# Patient Record
Sex: FEMALE | Race: WHITE | Employment: FULL TIME | URBAN - METROPOLITAN AREA
[De-identification: names, ages, dates, MRNs, and addresses within clinical notes are randomized per-mention and may not be internally consistent; named-entity substitution may affect disease eponyms.]

---

## 2023-10-16 DIAGNOSIS — M06.00 SERONEGATIVE RHEUMATOID ARTHRITIS (HCC): Primary | ICD-10-CM

## 2023-10-16 NOTE — TELEPHONE ENCOUNTER
Called patient. Trying to obtain medical records from last office. [facility]: [facility]: [facility]. Pharmacy: [facility]. - Called to verify No PA on Humira. PA start    Prior authorization for: Humira     Medication form: 40mg    Submission method: ABD    Date submitted: 10/16/23    Tracking #:    [id number] result: pended      Last labs: 3-4-5/ 23, LOV: 3/4/5- 23   Sample appropriate? Patient doesn't feel all better. Feet and hands.   Future Appointments   Date Time Provider [place] Toddi   12/4/2023  1:40 PM Joyce Rosenberg, MD [facility] [address]     Labs: pended labs:  Go to [facility] lab  Belen stiles

## 2023-10-20 RX ORDER — ADALIMUMAB 40MG/0.4ML
40 KIT SUBCUTANEOUS
Qty: 2 EACH | Refills: 3 | Status: SHIPPED | OUTPATIENT
Start: 2023-10-20 | End: 2023-10-24

## 2023-10-23 NOTE — TELEPHONE ENCOUNTER
PA status:  Called ABD:     Fax from [facility]: requesting new rx or verification   [facility]    Humira 40mg/0.4mL subcutaneous pen injector kit  3 refills

## 2023-10-24 RX ORDER — ADALIMUMAB 40MG/0.4ML
40 KIT SUBCUTANEOUS
Qty: 2 EACH | Refills: 3 | Status: SHIPPED | OUTPATIENT
Start: 2023-10-24

## 2023-10-24 NOTE — TELEPHONE ENCOUNTER
Fax from [facility] needing script for Humira. Was [facility] to [facility]. Patient insurance will need to be filled with [facility]. Resent to corrected pharmacy.

## 2023-10-30 ENCOUNTER — TELEPHONE (OUTPATIENT)
Dept: RHEUMATOLOGY | Facility: CLINIC | Age: 48
End: 2023-10-30

## 2023-10-30 NOTE — TELEPHONE ENCOUNTER
LOV: 12/20/2022  Future Appointments   Date Time Provider [place] Toddi   12/4/2023  1:40 PM Alejandro Rosenberg MD [facility] [address]   LABS:  [facility]

## 2023-10-31 RX ORDER — FOLIC ACID 1 MG/1
1 TABLET ORAL DAILY
Qty: 90 TABLET | Refills: 3 | Status: SHIPPED | OUTPATIENT
Start: 2023-10-31

## 2023-12-04 ENCOUNTER — APPOINTMENT (OUTPATIENT)
Dept: GENERAL RADIOLOGY | Age: 48
End: 2023-12-04
Attending: INTERNAL MEDICINE
Payer: COMMERCIAL

## 2023-12-04 ENCOUNTER — LAB ENCOUNTER (OUTPATIENT)
Dept: LAB | Age: 48
End: 2023-12-04
Attending: INTERNAL MEDICINE
Payer: COMMERCIAL

## 2023-12-04 ENCOUNTER — OFFICE VISIT (OUTPATIENT)
Dept: RHEUMATOLOGY | Facility: CLINIC | Age: 48
End: 2023-12-04
Payer: COMMERCIAL

## 2023-12-04 VITALS
TEMPERATURE: 98 F | SYSTOLIC BLOOD PRESSURE: 122 MMHG | OXYGEN SATURATION: 97 % | DIASTOLIC BLOOD PRESSURE: 70 MMHG | RESPIRATION RATE: 16 BRPM | WEIGHT: 196 LBS | HEART RATE: 73 BPM | HEIGHT: 64.34 IN | BODY MASS INDEX: 33.46 KG/M2

## 2023-12-04 DIAGNOSIS — M15.9 PRIMARY OSTEOARTHRITIS INVOLVING MULTIPLE JOINTS: ICD-10-CM

## 2023-12-04 DIAGNOSIS — M06.00 SERONEGATIVE RHEUMATOID ARTHRITIS (HCC): ICD-10-CM

## 2023-12-04 DIAGNOSIS — M79.7 FIBROMYALGIA: ICD-10-CM

## 2023-12-04 DIAGNOSIS — M05.79 RHEUMATOID ARTHRITIS INVOLVING MULTIPLE SITES WITH POSITIVE RHEUMATOID FACTOR (HCC): Primary | ICD-10-CM

## 2023-12-04 DIAGNOSIS — M05.79 RHEUMATOID ARTHRITIS INVOLVING MULTIPLE SITES WITH POSITIVE RHEUMATOID FACTOR (HCC): ICD-10-CM

## 2023-12-04 PROBLEM — M15.0 PRIMARY OSTEOARTHRITIS INVOLVING MULTIPLE JOINTS: Status: ACTIVE | Noted: 2023-12-04

## 2023-12-04 LAB
ALBUMIN SERPL-MCNC: 4.4 G/DL (ref 3.4–5)
ALBUMIN/GLOB SERPL: 1.3 {RATIO} (ref 1–2)
ALP LIVER SERPL-CCNC: 87 U/L
ALT SERPL-CCNC: 55 U/L
ANION GAP SERPL CALC-SCNC: 4 MMOL/L (ref 0–18)
AST SERPL-CCNC: 37 U/L (ref 15–37)
BASOPHILS # BLD AUTO: 0.06 X10(3) UL (ref 0–0.2)
BASOPHILS NFR BLD AUTO: 0.8 %
BILIRUB SERPL-MCNC: 0.5 MG/DL (ref 0.1–2)
BUN BLD-MCNC: 17 MG/DL (ref 9–23)
CALCIUM BLD-MCNC: 9.3 MG/DL (ref 8.5–10.1)
CHLORIDE SERPL-SCNC: 106 MMOL/L (ref 98–112)
CO2 SERPL-SCNC: 26 MMOL/L (ref 21–32)
CREAT BLD-MCNC: 1 MG/DL
EGFRCR SERPLBLD CKD-EPI 2021: 70 ML/MIN/1.73M2 (ref 60–?)
EOSINOPHIL # BLD AUTO: 0.37 X10(3) UL (ref 0–0.7)
EOSINOPHIL NFR BLD AUTO: 4.8 %
ERYTHROCYTE [DISTWIDTH] IN BLOOD BY AUTOMATED COUNT: 13.1 %
ERYTHROCYTE [SEDIMENTATION RATE] IN BLOOD: 7 MM/HR
FASTING STATUS PATIENT QL REPORTED: NO
GLOBULIN PLAS-MCNC: 3.5 G/DL (ref 2.8–4.4)
GLUCOSE BLD-MCNC: 94 MG/DL (ref 70–99)
HBV SURFACE AB SER QL: NONREACTIVE
HBV SURFACE AB SERPL IA-ACNC: <3.1 MIU/ML
HBV SURFACE AG SER-ACNC: <0.1 [IU]/L
HBV SURFACE AG SERPL QL IA: NONREACTIVE
HCT VFR BLD AUTO: 40.6 %
HGB BLD-MCNC: 13.7 G/DL
IMM GRANULOCYTES # BLD AUTO: 0.03 X10(3) UL (ref 0–1)
IMM GRANULOCYTES NFR BLD: 0.4 %
LYMPHOCYTES # BLD AUTO: 3.1 X10(3) UL (ref 1–4)
LYMPHOCYTES NFR BLD AUTO: 40.2 %
MCH RBC QN AUTO: 33.1 PG (ref 26–34)
MCHC RBC AUTO-ENTMCNC: 33.7 G/DL (ref 31–37)
MCV RBC AUTO: 98.1 FL
MONOCYTES # BLD AUTO: 0.43 X10(3) UL (ref 0.1–1)
MONOCYTES NFR BLD AUTO: 5.6 %
NEUTROPHILS # BLD AUTO: 3.72 X10 (3) UL (ref 1.5–7.7)
NEUTROPHILS # BLD AUTO: 3.72 X10(3) UL (ref 1.5–7.7)
NEUTROPHILS NFR BLD AUTO: 48.2 %
OSMOLALITY SERPL CALC.SUM OF ELEC: 283 MOSM/KG (ref 275–295)
PLATELET # BLD AUTO: 327 10(3)UL (ref 150–450)
POTASSIUM SERPL-SCNC: 4.4 MMOL/L (ref 3.5–5.1)
PROT SERPL-MCNC: 7.9 G/DL (ref 6.4–8.2)
RBC # BLD AUTO: 4.14 X10(6)UL
SODIUM SERPL-SCNC: 136 MMOL/L (ref 136–145)
TSI SER-ACNC: 1.65 MIU/ML (ref 0.36–3.74)
URATE SERPL-MCNC: 5.4 MG/DL
VIT D+METAB SERPL-MCNC: 29 NG/ML (ref 30–100)
WBC # BLD AUTO: 7.7 X10(3) UL (ref 4–11)

## 2023-12-04 PROCEDURE — 36415 COLL VENOUS BLD VENIPUNCTURE: CPT

## 2023-12-04 PROCEDURE — 85652 RBC SED RATE AUTOMATED: CPT | Performed by: INTERNAL MEDICINE

## 2023-12-04 PROCEDURE — 86480 TB TEST CELL IMMUN MEASURE: CPT

## 2023-12-04 PROCEDURE — 87522 HEPATITIS C REVRS TRNSCRPJ: CPT

## 2023-12-04 PROCEDURE — 87340 HEPATITIS B SURFACE AG IA: CPT

## 2023-12-04 PROCEDURE — 85025 COMPLETE CBC W/AUTO DIFF WBC: CPT | Performed by: INTERNAL MEDICINE

## 2023-12-04 PROCEDURE — 84443 ASSAY THYROID STIM HORMONE: CPT

## 2023-12-04 PROCEDURE — 80053 COMPREHEN METABOLIC PANEL: CPT

## 2023-12-04 PROCEDURE — 86706 HEP B SURFACE ANTIBODY: CPT

## 2023-12-04 PROCEDURE — 82306 VITAMIN D 25 HYDROXY: CPT

## 2023-12-04 PROCEDURE — 84550 ASSAY OF BLOOD/URIC ACID: CPT

## 2023-12-04 RX ORDER — ADALIMUMAB 40MG/0.4ML
40 KIT SUBCUTANEOUS
Qty: 2 EACH | Refills: 5 | Status: SHIPPED | OUTPATIENT
Start: 2023-12-04

## 2023-12-04 NOTE — PATIENT INSTRUCTIONS
OSTEOARTHRITIS    Fast Facts    Though some of the joint changes are irreversible, most patients will not need joint replacement surgery. OA symptoms (what you feel) can vary greatly among patients. A rheumatologist can detect arthritis and prescribe the proper treatment. The goal of treatment in OA is to reduce pain and improve function. Exercise is an important part of OA treatment, because it can decrease joint pain and improve function. At present, there is no treatment that can reverse the damage of OA in the joints. Researchers are trying to find ways to slow or reverse this joint damage. Osteoarthritis (also known as OA) is a common joint disease that most often affects middle-age to elderly people. It is commonly referred to as \"wear and tear\" of the joints, but we now know that OA is a disease of the entire joint, involving the cartilage, joint lining, ligaments, and bone. Although it is more common in older people, it is not really accurate to say that the joints are just \"wearing out. \" It is characterized by breakdown of the cartilage (the tissue that cushions the ends of the bones between joints), bony changes of the joints, deterioration of tendons and ligaments, and various degrees of inflammation of the joint lining (called the synovium). This arthritis tends to occur in the hand joints, spine, hips, knees, and great toes. The lifetime risk of developing OA of the knee is about 46%, and the lifetime risk of developing OA of the hip is 25%, according to the [facility], a long-term study from the [address] [place] [address] and sponsored by [employer] for [employer] and [employer] (often called the [employer]) and the [employer]. OA is a top cause of disability in older people. The goal of osteoarthritis treatment is to reduce pain and improve function.  There is no cure for the disease, but some treatments attempt to slow disease progression. What is osteoarthritis? OA is a frequently slowly progressive joint disease typically seen in middle-aged to elderly people. In osteoarthritis, the cartilage between the bones in the joint breaks down. This causes the affected bones to slowly get bigger. The joint cartilage often breaks down because of mechanical stress or biochemical changes within the body, causing the bone underneath to fail. OA can occur together with other types of arthritis, such as gout or rheumatoid arthritis. OA tends to affect commonly used joints such as the hands and spine, and the weight-bearing joints such as the hips and knees. Symptoms include:    Joint pain and stiffness    Knobby swelling at the joint    Cracking or grinding noise with joint movement    Decreased function of the joint    How do you treat osteoarthritis? There is no proven treatment yet that can reverse joint damage from OA. The goal of osteoarthritis treatment is to reduce pain and improve function of the affected joints. Most often, this is possible with a mixture of physical measures and drug therapy and, sometimes, surgery. Physical measures: Weight loss and exercise are useful in OA. Excess weight puts stress on your knee joints and hips and low back. For every 10 pounds of weight you lose over 10 years, you can reduce the chance of developing knee OA by up to 50 percent. Exercise can improve your muscle strength, decrease joint pain and stiffness, and lower the chance of disability due to OA. Also helpful are support (\"assistive\") devices, such as orthotics or a walking cane, that help you do daily activities. Heat or cold therapy can help relieve OA symptoms for a short time. Certain alternative treatments such as spa (hot tub), massage, and chiropractic manipulation can help relieve pain for a short time. They can be costly, though, and require repeated treatments.  Also, the long-term benefits of these alternative (sometimes called complementary or integrative) medicine treatments are unproven but are under study. Drug therapy: Forms of drug therapy include topical, oral (by mouth) and injections (shots). You apply topical drugs directly on the skin over the affected joints. These medicines include capsaicin cream, lidocaine and diclofenac gel. Oral pain relievers such as acetaminophen are common first treatments. So are nonsteroidal anti-inflammatory drugs (often called NSAIDs), which decrease swelling and pain. In 2010, the government (FDA) approved the use of duloxetine (Cymbalta) for chronic (long-term) musculoskeletal pain including from OA. This oral drug is not new. It also is in use for other health concerns, such as mood disorders, nerve pain and fibromyalgia. Patients with more serious pain may need stronger medications, such as prescription narcotics. Joint injections with corticosteroids (sometimes called cortisone shots) or with a form of lubricant called hyaluronic acid can give months of pain relief from OA. This lubricant is given in the knee, and these shots may help delay the need for a knee replacement by a few years in some patients. Surgery: Surgical treatment becomes an option for severe cases. This includes when the joint has serious damage, or when medical treatment fails to relieve pain and you have major loss of function. Surgery may involve arthroscopy, repair of the joint done through small incisions (cuts). If the joint damage cannot be repaired, you may need a joint replacement. Supplements: Many over-the-counter nutrition supplements have been used for osteoarthritis treatment. Most lack good research data to support their effectiveness and safety. Among the most widely used are calcium, vitamin D and omega-3 fatty acids. To ensure safety and avoid drug interactions, consult your doctor or pharmacist before using any of these supplements.  This is especially true when you are combining these supplements with prescribed

## 2023-12-05 DIAGNOSIS — E55.9 VITAMIN D DEFICIENCY: Primary | ICD-10-CM

## 2023-12-05 NOTE — TELEPHONE ENCOUNTER
Spoke to patient regarding the below message:    omponent  Ref Range & Units 1 d ago   Vitamin D, 25OH, Total  30.0 - 100.0 ng/mL 29.0 Low    Comment: Literature Recommendations for 25(OH)D levels are:  Range           Vitamin D Status   <20    ng/mL      Deficiency   20-<30 ng/mL      Insufficiency    ng/mL      Sufficiency   >100   ng/mL      Toxicity         Vitamin D is low at 29 would do high-dose vitamin D 50,000 units once a week for 3 months and then OTC 3 to 4000 IU D3  CBC CMP look good. We can refill methotrexate for 3 months. Please send back to me     Let me know if she needs the meloxicam as well and folic acid     Patient verbalized understanding and denied any questions at this time. Patient states the she is not sure if she needs refills on her meloxicam or folic acid but if she does she will call back and let us know.      High dose vitamin D script pended to you for approval.

## 2023-12-05 NOTE — TELEPHONE ENCOUNTER
omponent  Ref Range & Units 1 d ago   Vitamin D, 25OH, Total  30.0 - 100.0 ng/mL 29.0 Low    Comment: Literature Recommendations for 25(OH)D levels are:  Range           Vitamin D Status   <20    ng/mL      Deficiency   20-<30 ng/mL      Insufficiency    ng/mL      Sufficiency   >100   ng/mL      Toxicity       Vitamin D is low at 29 would do high-dose vitamin D 50,000 units once a week for 3 months and then OTC 3 to 4000 IU D3  CBC CMP look good. We can refill methotrexate for 3 months.   Please send back to me    Let me know if she needs the meloxicam as well and folic acid

## 2023-12-06 ENCOUNTER — PATIENT MESSAGE (OUTPATIENT)
Dept: RHEUMATOLOGY | Facility: CLINIC | Age: 48
End: 2023-12-06

## 2023-12-06 DIAGNOSIS — E55.9 VITAMIN D DEFICIENCY: Primary | ICD-10-CM

## 2023-12-06 LAB
M TB IFN-G CD4+ T-CELLS BLD-ACNC: 0.01 IU/ML
M TB TUBERC IFN-G BLD QL: NEGATIVE
M TB TUBERC IGNF/MITOGEN IGNF CONTROL: >10 IU/ML
QFT TB1 AG MINUS NIL: 0.03 IU/ML
QFT TB2 AG MINUS NIL: 0 IU/ML

## 2023-12-06 RX ORDER — ERGOCALCIFEROL 1.25 MG/1
50000 CAPSULE ORAL WEEKLY
Qty: 12 CAPSULE | Refills: 0 | Status: SHIPPED | OUTPATIENT
Start: 2023-12-06 | End: 2024-02-28

## 2023-12-06 RX ORDER — ERGOCALCIFEROL 1.25 MG/1
50000 CAPSULE ORAL WEEKLY
Qty: 12 CAPSULE | Refills: 0 | Status: SHIPPED | OUTPATIENT
Start: 2023-12-06 | End: 2024-01-05

## 2023-12-06 NOTE — TELEPHONE ENCOUNTER
From: Brie Rivas  To: Amando Rosenberg  Sent: 12/6/2023 10:56 AM CST  Subject: Vitamin d prescription     Hello. I know it was mentioned about me taking vitamin d prescription. But I was wondering when that would be sent in to [place] off [facility]. I was there yesterday and it had not been sent yet.      Thanks

## 2023-12-07 ENCOUNTER — HOSPITAL ENCOUNTER (OUTPATIENT)
Dept: GENERAL RADIOLOGY | Age: 48
Discharge: HOME | End: 2023-12-07
Attending: INTERNAL MEDICINE
Payer: COMMERCIAL

## 2023-12-07 DIAGNOSIS — M79.7 FIBROMYALGIA: ICD-10-CM

## 2023-12-07 DIAGNOSIS — M05.79 RHEUMATOID ARTHRITIS INVOLVING MULTIPLE SITES WITH POSITIVE RHEUMATOID FACTOR (HCC): ICD-10-CM

## 2023-12-07 PROCEDURE — 73130 X-RAY EXAM OF HAND: CPT | Performed by: INTERNAL MEDICINE

## 2023-12-07 PROCEDURE — 71046 X-RAY EXAM CHEST 2 VIEWS: CPT | Performed by: INTERNAL MEDICINE

## 2023-12-11 ENCOUNTER — TELEPHONE (OUTPATIENT)
Dept: RHEUMATOLOGY | Facility: CLINIC | Age: 48
End: 2023-12-11

## 2023-12-11 NOTE — TELEPHONE ENCOUNTER
PROCEDURE:  XR CHEST PA + LAT CHEST (CPT=71046)     INDICATIONS:  M79.7 Fibromyalgia M05.79 Rheumatoid arthritis involving multiple sites with positive rheumatoid factor (HCC)     COMPARISON:  None. TECHNIQUE:  PA and lateral chest radiographs were obtained. PATIENT STATED HISTORY: (As transcribed by Technologist)  Patient shares she is having evaluation for her rheumatoid arthritis. FINDINGS:    Cardiac size and pulmonary vasculature are within normal limits. No pleural effusions. No pneumothorax. Left lingular opacity. Impression  CONCLUSION:  Opacity within the left lingula which may represent atelectasis versus infiltrates. Chest x-ray shows a possible infiltrate versus just lung collapse. If she has no fevers chills shortness of breath cough or recent infection likely related to some lung collapse which means she just has to take some deep breath    To open up her lungs. If she develops any worsening shortness of breath fevers chills shortness of breath productive cough etc. she would need to see her PCP and consider infection and antibiotics    Everything else looks good on her labs so far.

## 2023-12-12 NOTE — TELEPHONE ENCOUNTER
Called pt reviewed Dr. Ivone Man note with pt  Chest x-ray shows a possible infiltrate versus just lung collapse. If she has no fevers chills shortness of breath cough or recent infection likely related to some lung collapse which means she just has to take some deep breath     To open up her lungs. If she develops any worsening shortness of breath fevers chills shortness of breath productive cough etc. she would need to see her PCP and consider infection and antibiotics     Everything else looks good on her labs so far.          Pt denies any signs of infection , but will reach out to PCP for occasional wimpy cough

## 2024-02-24 DIAGNOSIS — E55.9 VITAMIN D DEFICIENCY: ICD-10-CM

## 2024-02-26 RX ORDER — ERGOCALCIFEROL 1.25 MG/1
50000 CAPSULE ORAL WEEKLY
Qty: 12 CAPSULE | Refills: 0 | Status: SHIPPED | OUTPATIENT
Start: 2024-02-26

## 2024-02-26 NOTE — TELEPHONE ENCOUNTER
Future Appointments   Date Time Provider Department Center   6/4/2024 10:00 AM Dimple Rosenberg MD [facility] [address]     Last office visit: 12/4/2023    Last fill: 12/6/2023 12 cap, 0 refills

## 2024-04-11 ENCOUNTER — PATIENT MESSAGE (OUTPATIENT)
Dept: RHEUMATOLOGY | Facility: CLINIC | Age: 49
End: 2024-04-11

## 2024-04-11 DIAGNOSIS — M05.79 RHEUMATOID ARTHRITIS INVOLVING MULTIPLE SITES WITH POSITIVE RHEUMATOID FACTOR (HCC): Primary | ICD-10-CM

## 2024-04-11 DIAGNOSIS — M06.00 SERONEGATIVE RHEUMATOID ARTHRITIS (HCC): ICD-10-CM

## 2024-04-11 NOTE — TELEPHONE ENCOUNTER
Phoned pt, explained per Dr. Rosenberg LOV note pt to have blood work completed every 8-12 wks. Pt states she had completed at [facility] on 3/29. Explained that I will submit letter to [facility] to request. Letter faxed to Eric Lipan requesting copy of labs.

## 2024-04-22 ENCOUNTER — PATIENT MESSAGE (OUTPATIENT)
Dept: RHEUMATOLOGY | Facility: CLINIC | Age: 49
End: 2024-04-22

## 2024-04-22 DIAGNOSIS — M05.79 RHEUMATOID ARTHRITIS INVOLVING MULTIPLE SITES WITH POSITIVE RHEUMATOID FACTOR (HCC): ICD-10-CM

## 2024-04-22 DIAGNOSIS — M06.00 SERONEGATIVE RHEUMATOID ARTHRITIS (HCC): ICD-10-CM

## 2024-04-22 RX ORDER — METHOTREXATE 2.5 MG/1
TABLET ORAL
Qty: 60 TABLET | Refills: 0 | Status: SHIPPED | OUTPATIENT
Start: 2024-04-22

## 2024-04-22 NOTE — TELEPHONE ENCOUNTER
[employer] message sent to patient regarding her lab results and methotrexate. Patient was asked to call the office with any questions she may have.

## 2024-05-06 RX ORDER — METHOTREXATE 2.5 MG/1
TABLET ORAL
Qty: 65 TABLET | Refills: 0 | Status: CANCELLED
Start: 2024-05-06

## 2024-05-06 RX ORDER — METHOTREXATE 2.5 MG/1
12.5 TABLET ORAL WEEKLY
Qty: 65 TABLET | Refills: 0 | OUTPATIENT
Start: 2024-05-06

## 2024-05-06 RX ORDER — METHOTREXATE 2.5 MG/1
TABLET ORAL
Qty: 65 TABLET | Refills: 0 | Status: SHIPPED | OUTPATIENT
Start: 2024-05-06 | End: 2024-05-06

## 2024-05-10 ENCOUNTER — APPOINTMENT (OUTPATIENT)
Dept: URGENT CARE | Age: 49
End: 2024-05-10

## 2024-05-10 ENCOUNTER — TELEPHONE (OUTPATIENT)
Dept: OTHER | Age: 49
End: 2024-05-10

## 2024-05-10 ENCOUNTER — WALK IN (OUTPATIENT)
Dept: URGENT CARE | Age: 49
End: 2024-05-10

## 2024-05-10 VITALS
RESPIRATION RATE: 16 BRPM | TEMPERATURE: 97.5 F | HEART RATE: 63 BPM | OXYGEN SATURATION: 98 % | SYSTOLIC BLOOD PRESSURE: 126 MMHG | DIASTOLIC BLOOD PRESSURE: 89 MMHG

## 2024-05-10 DIAGNOSIS — J30.2 SEASONAL ALLERGIES: Primary | ICD-10-CM

## 2024-05-10 DIAGNOSIS — H57.11 EYE DISCOMFORT, RIGHT: ICD-10-CM

## 2024-05-10 RX ORDER — MELOXICAM 15 MG/1
15 TABLET ORAL DAILY
COMMUNITY

## 2024-05-10 RX ORDER — ADALIMUMAB 40MG/0.4ML
KIT SUBCUTANEOUS
COMMUNITY
Start: 2023-12-04

## 2024-05-10 RX ORDER — METHOTREXATE 2.5 MG/1
12.5 TABLET ORAL
COMMUNITY
Start: 2024-05-07

## 2024-05-10 RX ORDER — DULOXETIN HYDROCHLORIDE 60 MG/1
60 CAPSULE, DELAYED RELEASE ORAL DAILY
COMMUNITY
Start: 2024-03-03

## 2024-05-10 RX ORDER — FOLIC ACID 1 MG/1
TABLET ORAL
COMMUNITY
Start: 2024-05-10

## 2024-05-10 ASSESSMENT — ENCOUNTER SYMPTOMS
COUGH: 0
EYE PAIN: 0
CHILLS: 0
FEVER: 0
BLURRED VISION: 0
PHOTOPHOBIA: 0
SHORTNESS OF BREATH: 0
EYE REDNESS: 1
DOUBLE VISION: 0
EYE DISCHARGE: 0
HEADACHES: 0
DIZZINESS: 0

## 2024-05-10 ASSESSMENT — VISUAL ACUITY
OS_CC: 20/25
OD_CC: 20/15

## 2024-05-13 ENCOUNTER — PATIENT MESSAGE (OUTPATIENT)
Dept: RHEUMATOLOGY | Facility: CLINIC | Age: 49
End: 2024-05-13

## 2024-05-13 DIAGNOSIS — M05.79 RHEUMATOID ARTHRITIS INVOLVING MULTIPLE SITES WITH POSITIVE RHEUMATOID FACTOR (HCC): Primary | ICD-10-CM

## 2024-05-13 DIAGNOSIS — M79.7 FIBROMYALGIA: ICD-10-CM

## 2024-05-13 DIAGNOSIS — M15.9 PRIMARY OSTEOARTHRITIS INVOLVING MULTIPLE JOINTS: ICD-10-CM

## 2024-05-14 RX ORDER — MELOXICAM 15 MG/1
15 TABLET ORAL DAILY
Qty: 90 TABLET | Refills: 0 | Status: SHIPPED | OUTPATIENT
Start: 2024-05-14

## 2024-05-14 NOTE — TELEPHONE ENCOUNTER
Future Appointments   Date Time Provider Department Center   6/4/2024 10:00 AM Dimple Rosenberg MD [facility] [address]     Last office visit: 12/4/2023    Last fill: 2/3/2024 90 each, 0 refills    Last lab: 3/30/2024 (Scanned)

## 2024-05-14 NOTE — TELEPHONE ENCOUNTER
From: Melissa Bowman  To: Dimple Rosenberg  Sent: 5/13/2024 7:24 PM CDT  Subject: Refills needed please.     Hello,  I went to refill my meloxicam, which is past due, and Karly attempted to send a request in today.   Just wondering if it was received or not.   If not, could I please get a refill of meloxicam se t to [facility] in [facility].   Also, this is the last week for vitamin d medication and I’m not sure if Dr. Rosenberg wants me to continue that, if so, that is out as well (as of this coming Friday).   Thank you.

## 2024-05-23 DIAGNOSIS — E55.9 VITAMIN D DEFICIENCY: ICD-10-CM

## 2024-05-23 RX ORDER — ERGOCALCIFEROL 1.25 MG/1
50000 CAPSULE ORAL WEEKLY
Qty: 12 CAPSULE | Refills: 0 | OUTPATIENT
Start: 2024-05-23

## 2024-06-04 ENCOUNTER — OFFICE VISIT (OUTPATIENT)
Dept: RHEUMATOLOGY | Facility: CLINIC | Age: 49
End: 2024-06-04
Payer: COMMERCIAL

## 2024-06-04 VITALS
DIASTOLIC BLOOD PRESSURE: 80 MMHG | BODY MASS INDEX: 34.15 KG/M2 | OXYGEN SATURATION: 97 % | HEART RATE: 69 BPM | SYSTOLIC BLOOD PRESSURE: 120 MMHG | RESPIRATION RATE: 16 BRPM | TEMPERATURE: 98 F | HEIGHT: 64 IN | WEIGHT: 200 LBS

## 2024-06-04 DIAGNOSIS — Z79.899 ENCOUNTER FOR DRUG THERAPY: ICD-10-CM

## 2024-06-04 DIAGNOSIS — M06.00 SERONEGATIVE RHEUMATOID ARTHRITIS (HCC): ICD-10-CM

## 2024-06-04 DIAGNOSIS — M05.79 RHEUMATOID ARTHRITIS INVOLVING MULTIPLE SITES WITH POSITIVE RHEUMATOID FACTOR (HCC): Primary | ICD-10-CM

## 2024-06-04 DIAGNOSIS — D84.9 IMMUNOCOMPROMISED (HCC): ICD-10-CM

## 2024-06-04 DIAGNOSIS — M15.9 PRIMARY OSTEOARTHRITIS INVOLVING MULTIPLE JOINTS: ICD-10-CM

## 2024-06-04 DIAGNOSIS — M79.7 FIBROMYALGIA: ICD-10-CM

## 2024-06-04 PROCEDURE — 3074F SYST BP LT 130 MM HG: CPT | Performed by: INTERNAL MEDICINE

## 2024-06-04 PROCEDURE — 3008F BODY MASS INDEX DOCD: CPT | Performed by: INTERNAL MEDICINE

## 2024-06-04 PROCEDURE — 99214 OFFICE O/P EST MOD 30 MIN: CPT | Performed by: INTERNAL MEDICINE

## 2024-06-04 PROCEDURE — 3079F DIAST BP 80-89 MM HG: CPT | Performed by: INTERNAL MEDICINE

## 2024-06-04 PROCEDURE — G2211 COMPLEX E/M VISIT ADD ON: HCPCS | Performed by: INTERNAL MEDICINE

## 2024-06-04 RX ORDER — ADALIMUMAB 40MG/0.4ML
40 KIT SUBCUTANEOUS
Qty: 2 EACH | Refills: 5 | Status: SHIPPED | OUTPATIENT
Start: 2024-06-04

## 2024-06-04 RX ORDER — DULOXETIN HYDROCHLORIDE 60 MG/1
60 CAPSULE, DELAYED RELEASE ORAL DAILY
Qty: 90 CAPSULE | Refills: 1 | Status: SHIPPED | OUTPATIENT
Start: 2024-06-04

## 2024-06-04 RX ORDER — MELOXICAM 15 MG/1
15 TABLET ORAL DAILY
Qty: 90 TABLET | Refills: 1 | Status: SHIPPED | OUTPATIENT
Start: 2024-06-04

## 2024-06-04 RX ORDER — CETIRIZINE HYDROCHLORIDE 10 MG/1
10 TABLET ORAL DAILY
COMMUNITY
Start: 2023-05-28

## 2024-06-04 NOTE — PROGRESS NOTES
[facility], [address] [place] [address]      Consult     Melissa Bowman Patient Status:  No patient class for patient encounter    3/28/1975 MRN [id number]   Location [facility], [address] [place] [address] Attending No att. providers found   Hosp Day # 0 PCP Johnnie Walker DO     Referring Provider: PCP    Reason for Consultation: Seropositive rheumatoid arthritis; osteoarthritis fibromyalgia    Subjective:    Melissa Bowman is a 49 year old female with     HPI:Seropositive +RF, +CCP Rheumatoid Arthritis, Osteoarthritis, Fibromyalgia    49-year-old woman comes in for followup for seropositive RA and osteoarthritis and fibromyalgia.     Patient was last seen in clinic 2023    Continued on Humira 40 mg subcu every other week and methotrexate 15 mg once a week    Labs 2024 normal CBC CMP    She has been doing well overall    Overall no flare ups of her rheumatoid arthritis.     Previously given trazodone but she never tried it with Cymbalta 60 mg daily because of fear of side effects    She never ended up seeing a psychiatrist or therapist.    Continues to have stressors with nonrestorative sleep fatigue and some neuropathies because of ongoing issues with child support and tax issues with her previous ex- from 2018 as she is fighting with the [employer]    She states no flareups of her rheumatoid arthritis itself.     At that time she continued on Humira 40 subcutaneous every other week and methotrexate 15mg once a week.     She states somehow she has been getting her medications despite no labs since last visit    X-rays of the hands and feet showed stable arthritic changes 2023 chest x-ray showed atelectasis patient had no signs or symptoms of infection.  Continues to do well without any recent infections shortness of breath or chest pain    Denies any fevers or chills    Reminded patient to update labs shortly.  New standing labs given to  patient    She was also continued on Cymbalta 60 mg daily.    She takes them her meloxicam periodically     She is not having any major flareups since last visit.    SHe states no infection since last visit.     Previously she was also diagnosed with ADHD depression and anxiety to her PCP.     Denies Any suicidal ideation. Her left shoulder has been doing well. When this flares up she sees her orthopedic surgeon.    She has not had a shoulder injection quite. MRI did reveal previously moderate a.c. joint arthritis.     Denies any swelling or pain of her other joints.     Her rheumatoid arthritis has been stable. Denies any fevers or chills no shortness breath or chest pain. Denies any infection since last visit. Her weight has been stable.    As for her RA, she otherwise feels she is stable. Last Vectra was in low disease activity range at 28 in December 2016. She denies swelling in pain in her other joints.     She historically has had positive RF and CCP antibodies and negative KENYON.     History/Other:        Past Medical History:  Past Medical History:    Fibromyalgia    Rheumatoid arthritis (HCC)        Past Surgical History: History reviewed. No pertinent surgical history.    Social History:  reports that she has quit smoking. She has never used smokeless tobacco. She reports current alcohol use. She reports that she does not use drugs.    Family History: History reviewed. No pertinent family history.    Allergies:   Allergies   Allergen Reactions    Sulfa Antibiotics HIVES, PALPITATIONS, SHORTNESS OF BREATH and UNKNOWN     Other reaction(s): Palpitations       Current Medications:  Current Outpatient Medications   Medication Sig Dispense Refill    cetirizine (ZYRTEC ALLERGY) 10 MG Oral Tab Take 1 tablet (10 mg total) by mouth daily.      Adalimumab (HUMIRA, 2 PEN,) 40 MG/0.4ML Subcutaneous Pen-injector Kit Inject 40 mg into the skin every 14 (fourteen) days. 2 each 5    DULoxetine 60 MG Oral Cap DR Particles  Take 1 capsule (60 mg total) by mouth daily. 90 capsule 1    Meloxicam 15 MG Oral Tab Take 1 tablet (15 mg total) by mouth daily. 90 tablet 1    methotrexate 2.5 MG Oral Tab Take 5 tablets (12.5 mg total) by mouth once a week. 65 tablet 0    folic acid 1 MG Oral Tab Take 1 tablet (1 mg total) by mouth daily. 90 tablet 3      No current facility-administered medications for this visit.       (Not in a hospital admission)      Review of Systems:     Constitutional: Negative for chills, , fatigue, fever and unexpected weight change.    HENT: Negative for congestion, and mouth sores.    Eyes: Negative for photophobia, pain, redness and visual disturbance.    Respiratory: Negative for apnea, cough, chest tightness, shortness of breath, wheezing and stridor.    Cardiovascular: Negative for chest pain, palpitations and leg swelling.    Gastrointestinal: Negative for abdominal distention, abdominal pain, blood in stool, constipation, diarrhea and nausea.    Endocrine: Negative.     Genitourinary: Negative for decreased urine volume, difficulty urinating, dyspareunia, dysuria, flank pain, and frequency.    Musculoskeletal: Occasional for arthralgias, no gait problem and joint swelling.    Skin: Negative for color change, pallor and rash. No raynauds or digital ulcerations no sclerodactly.    Allergic/Immunologic: Negative.    Neurological: Negative for dizziness, tremors, seizures, syncope, speech difficulty, weakness, light-headedness, numbness and headaches.    Hematological: Does not bruise/bleed easily.    Psychiatric/Behavioral: Negative for confusion, decreased concentration, hallucinations, self-injury, sleep disturbance and suicidal ideas or depression.    Objective:   [unfilled]  Vitals:    06/04/24 0954   BP: 120/80   Pulse: 69   Resp: 16   Temp: 97.7 °F (36.5 °C)          Constitutional: is oriented to person, place, and time. Appears well-developed and well-nourished. No distress.    HEENT: Normocephalic;  EOMI; no jvd; no LAD; no oral or nasal ulcers.     Eyes: Conjunctivae and EOM are normal. Pupils are equal, round, and reactive to light.     Neck: Normal range of motion. No thyromegaly present.    Cardiovascular: RRR, no murmurs.    Lungs: Clear, Bilateral air entry, no wheezes.    Abdominal: Soft.    Musculoskeletal:    There [facility]. Go to the Rheumatology activity and [facility].     Joint Exam 06/04/2024     No joint exam has been documented for this visit        Swollen: -- 0    Tender: -- 0        Right shoulder: Exhibits normal range of motion on abduction and internal rotation, no tenderness, no bony tenderness, no deformity, no laceration, no pain and no spasm.        Left shoulder: Exhibits normal range of motion on abduction and internal and external rotation.  no tenderness, no bony tenderness, no swelling, no effusion, no deformity, no pain, no spasm and normal strength.        Right elbow:  Exhibits normal range of motion, no swelling, no effusion and no deformity. No tenderness found. No medial epicondyle, no lateral epicondyle and no olecranon process tenderness noted. There are no contractures or tophi or nodules.        Left elbow:  Normal range of motion, no swelling, no effusion and no deformity. No medial epicondyle, no lateral epicondyle and no olecranon process tenderness noted. There are no contractures or tophi or nodules.        Right wrist:  Exhibits normal range of motion, no tenderness, no bony tenderness, no swelling, no effusion and no crepitus. Flexion and extension intact w/o limitation.        Left wrist: Exhibits normal range of motion, no tenderness, no bony tenderness, no swelling, no effusion, no crepitus and no deformity. Flexion and extension intact without limitation.        Right hip: Exhibits normal range of motion, normal strength, no tenderness, no bony tenderness, no swelling and no crepitus.         Right hand: No synovitis of MCP,PIP or DIP joints; no Bouchards or Heberden nodules noted;  strength: 100%.  Mild squaring first CMC joint        Left hand: No synovitis of MCP,PIP or DIP joints; no Bouchards or Heberden nodules noted;  strength: 100%.  Mild squaring first CMC joint        Left hip: Exhibits normal range of motion, normal strength, no tenderness, no bony tenderness, No swelling and no crepitus.        Right knee: Exhibits normal range of motion, no swelling, no effusion, no ecchymosis, no deformity and no erythema. No tenderness found. No medial joint line, no lateral joint line, no MCL and no LCL tenderness noted. mild crepitation on flexion of knee and extension normal.        Left knee:  Exhibits normal range of motion, no swelling, no effusion, no ecchymosis and no erythema. No tenderness found. No medial joint line, no lateral joint line and no patellar tendon tenderness noted. mild crepitation on flexion of the knee. Extension intact and normal.        Right ankle: No swelling, no deformity. No tenderness. Dorsiflexion and plantar flexion intact without limitation in range of motion.        Left ankle: Exhibits no swelling. No tenderness. No lateral malleolus and no medial malleolus tenderness found. Achilles tendon normal. Achilles tendon exhibits no pain, no defect and normal Echeverria's test results.  Dorsiflexion and plantar flexion intact without limitation in range of motion.        Cervical back: Exhibits normal range of motion, no tenderness, no bony tenderness, no swelling, no pain and no spasm.        Thoracic back: Exhibits normal range of motion, no tenderness, no bony tenderness and no spasm.        Lumbar back:  Exhibits normal range of motion, no tenderness, no bony tenderness, no pain and no spasm.        Right foot: normal. There is normal range of motion, no tenderness, no bony tenderness, no crepitus and no laceration. There is no synovitis or tenderness of the MTP  joints to palpation.  Bony enlargement first MTP joint        Left foot: normal. There is normal range of motion, no tenderness, no bony tenderness and no crepitus. There is no synovitis or tenderness of the MTP joints to palpation.  Bony enlargement first MTP joint    Lymphadenopathy: No submental, no submandibular, and no occipital adenopathy present, has no cervical adenopathy or axillary lympadenopathy.    Neurological: Alert and oriented. No focal motor or sensory abnormalities. Strength is 5/5 Upper Extremities/Lower Extremities proximally and distally.    Skin: Skin is warm, dry and intact.  No rashes no purpuric petechial lesions    Psychiatric: Normal behavior.    Results:    Labs:      Lab Results   Component Value Date    WBC 7.7 12/04/2023    RBC 4.14 12/04/2023    HGB 13.7 12/04/2023    HCT 40.6 12/04/2023    MCV 98.1 12/04/2023    MCH 33.1 12/04/2023    MCHC 33.7 12/04/2023    RDW 13.1 12/04/2023    .0 12/04/2023       No components found for: \"RELY\", \"NMET\", \"MYEL\", \"PROMY\", \"SUNIL\", \"ABSNEUTS\", \"ABSBANDS\", \"ABMM\", \"ABMY\", \"ABPM\", \"ABBL\"      Lab Results   Component Value Date     12/04/2023    K 4.4 12/04/2023    CO2 26.0 12/04/2023    BUN 17 12/04/2023    ALB 4.4 12/04/2023    AST 37 12/04/2023    ALT 55 12/04/2023          No components found for: \"ESRWESTERGRN\"       No results found for: \"CRP\"      No results found for: \"COLOR\", \"CLARITY\", \"UROBILINOGEN\", \"YEAST\"  @LABRCNTIP(RF,B12)@      [unfilled]    Imaging:  No results found.    MRI 6/17: left shoulder:    X rays foot 11/15:    X ray hands 11/15:    Chest x ray 8/15:      Assessment & Plan:      Seropositive rheumatoid arthritis  Osteoarthritis  Fibromyalgia with nonrestorative sleep fatigue depression neuropathies insomnia  Encounter for therapeutic drug monitoring    Patient has no synovitis on exam.  Continue Humira 40 mg subcutaneous every other week  continue methotrexate to 15mg a week   Continue monitoring labs every 8-12  weeks for drug toxicity.  Labs reviewed March 2024 CBC CMP sed rate normal  New standing labs given to patient  Patient states she has been taking methotrexate but unclear when last labs were.  Discussed the importance of monitoring for toxicity  Mainly chronic changes of the right foot and MTP joints. She does have significant calcaneal spurs based on x-rays. She had no erosive changes.   Hand x-rays showing stable arthritic changes.  Chest x-ray showing atelectasis no signs symptomatically of infection.  From December 2023  Discussed the importance of compliance to prevent long term joint damage     She is having a lot of financial issues at this time from previous divorce    Recent death of her dad coping better more recently    Generalized osteoarthritis: Continue Tylenol.    Bone health: She is still having periods. She is not postmenopausal.    Chronic plantar fasciitis improving with intervention stretching and foot inserts. Responded to Medrol Dosepak in the past    Myofascial pain syndrome//fibromyalgia: Continue Cymbalta 60 mg daily.     recommend she see a psychiatrist for management of her depression, anxiety and ADHD.   Appears to be better controlled. No insomnia is the biggest problem       Education and counseling provided to patient.  Instructed patient to call my office or seek medical attention immediately if symptoms worsen. Risks and side effects of medications and diagnosis discussed in detail and patient was given written information on new prescribed medications.    Return to clinic:  Return in about 6 months (around 12/4/2024).    Dimple Rosenberg MD  12/4/2023

## 2024-07-23 DIAGNOSIS — M05.79 RHEUMATOID ARTHRITIS INVOLVING MULTIPLE SITES WITH POSITIVE RHEUMATOID FACTOR (HCC): ICD-10-CM

## 2024-07-23 DIAGNOSIS — M06.00 SERONEGATIVE RHEUMATOID ARTHRITIS (HCC): ICD-10-CM

## 2024-07-23 RX ORDER — METHOTREXATE 2.5 MG/1
TABLET ORAL
Qty: 20 TABLET | Refills: 0 | OUTPATIENT
Start: 2024-07-23

## 2024-07-23 NOTE — TELEPHONE ENCOUNTER
LOV:  06/04/2024    Future Appointments   Date Time Provider Department Center   12/4/2024  9:40 AM Dimple Rosenberg MD [id number] [address]       LF:  05/07/2024    QTY:   65    Refills:   0    LABS: Scanned under the lab tab on 05/01/2024 DRAWN on 03/29/2024

## 2024-07-24 NOTE — TELEPHONE ENCOUNTER
Left detailed message on patient's voicemail regarding the below message:    She is overdue for labs.  I see the last 1 scanned her April or March     Patient was asked to call the office back with any questions she may have.

## 2024-08-06 DIAGNOSIS — M06.00 SERONEGATIVE RHEUMATOID ARTHRITIS (HCC): ICD-10-CM

## 2024-08-06 DIAGNOSIS — M05.79 RHEUMATOID ARTHRITIS INVOLVING MULTIPLE SITES WITH POSITIVE RHEUMATOID FACTOR (HCC): ICD-10-CM

## 2024-08-06 RX ORDER — METHOTREXATE 2.5 MG/1
12.5 TABLET ORAL WEEKLY
Qty: 65 TABLET | Refills: 0 | OUTPATIENT
Start: 2024-08-06

## 2024-08-12 DIAGNOSIS — M79.7 FIBROMYALGIA: ICD-10-CM

## 2024-08-12 DIAGNOSIS — M05.79 RHEUMATOID ARTHRITIS INVOLVING MULTIPLE SITES WITH POSITIVE RHEUMATOID FACTOR (HCC): ICD-10-CM

## 2024-08-12 DIAGNOSIS — M15.0 PRIMARY OSTEOARTHRITIS INVOLVING MULTIPLE JOINTS: ICD-10-CM

## 2024-08-12 RX ORDER — MELOXICAM 15 MG/1
15 TABLET ORAL DAILY
Qty: 90 TABLET | Refills: 1 | Status: SHIPPED | OUTPATIENT
Start: 2024-08-12

## 2024-08-12 NOTE — TELEPHONE ENCOUNTER
LOV:  06/04/2024    Future Appointments   Date Time Provider Department Center   12/4/2024  9:40 AM Dimple Rosenberg MD [facility] [address]       LF:  05/07/2024    QTY:   65    Refills:   0    LABS: Scanned under the lab tab on 08/07 and DRAWN on 06/17/2024

## 2024-10-30 DIAGNOSIS — M79.7 FIBROMYALGIA: Primary | ICD-10-CM

## 2024-10-30 RX ORDER — FOLIC ACID 1 MG/1
1 TABLET ORAL DAILY
Qty: 90 TABLET | Refills: 3 | Status: SHIPPED | OUTPATIENT
Start: 2024-10-30

## 2024-10-30 NOTE — TELEPHONE ENCOUNTER
Last office visit: 6/4/24    Next Rheum Apt:12/4/2024 Dimple Rosenberg MD    Last fill: 10/31/23    Labs:   Lab Results   Component Value Date    CREATSERUM 1.00 12/04/2023    ALKPHO 87 12/04/2023    AST 37 12/04/2023    ALT 55 12/04/2023    BILT 0.5 12/04/2023    TP 7.9 12/04/2023    ALB 4.4 12/04/2023       Lab Results   Component Value Date    WBC 7.7 12/04/2023    HGB 13.7 12/04/2023    .0 12/04/2023    NEPRELIM 3.72 12/04/2023    NEPERCENT 48.2 12/04/2023    LYPERCENT 40.2 12/04/2023    NE 3.72 12/04/2023    LYMABS 3.10 12/04/2023

## 2024-11-07 ENCOUNTER — TELEPHONE (OUTPATIENT)
Dept: RHEUMATOLOGY | Facility: CLINIC | Age: 49
End: 2024-11-07

## 2024-11-07 DIAGNOSIS — M79.7 FIBROMYALGIA: ICD-10-CM

## 2024-11-07 DIAGNOSIS — M05.79 RHEUMATOID ARTHRITIS INVOLVING MULTIPLE SITES WITH POSITIVE RHEUMATOID FACTOR (HCC): Primary | ICD-10-CM

## 2024-11-07 DIAGNOSIS — M06.00 SERONEGATIVE RHEUMATOID ARTHRITIS (HCC): ICD-10-CM

## 2024-11-07 NOTE — TELEPHONE ENCOUNTER
Last office visit: 6/4/24    Next Rheum Apt:12/4/2024 Dimple Rosenberg MD    Last fill:   Methotrexate: 5/7/24  Folic Acid: 10/30/24    Labs:   Lab Results   Component Value Date    CREATSERUM 1.00 12/04/2023    ALKPHO 87 12/04/2023    AST 37 12/04/2023    ALT 55 12/04/2023    BILT 0.5 12/04/2023    TP 7.9 12/04/2023    ALB 4.4 12/04/2023       Lab Results   Component Value Date    WBC 7.7 12/04/2023    HGB 13.7 12/04/2023    .0 12/04/2023    NEPRELIM 3.72 12/04/2023    NEPERCENT 48.2 12/04/2023    LYPERCENT 40.2 12/04/2023    NE 3.72 12/04/2023    LYMABS 3.10 12/04/2023

## 2024-11-11 RX ORDER — FOLIC ACID 1 MG/1
1 TABLET ORAL DAILY
Qty: 90 TABLET | Refills: 3 | Status: SHIPPED | OUTPATIENT
Start: 2024-11-11

## 2024-11-11 RX ORDER — METHOTREXATE 2.5 MG/1
12.5 TABLET ORAL WEEKLY
Qty: 65 TABLET | Refills: 0 | Status: SHIPPED | OUTPATIENT
Start: 2024-11-11

## 2024-12-23 DIAGNOSIS — M06.00 SERONEGATIVE RHEUMATOID ARTHRITIS (HCC): Primary | ICD-10-CM

## 2024-12-23 RX ORDER — ADALIMUMAB 40MG/0.4ML
40 KIT SUBCUTANEOUS
Qty: 2 EACH | Refills: 3 | Status: SHIPPED | OUTPATIENT
Start: 2024-12-23

## 2024-12-23 NOTE — TELEPHONE ENCOUNTER
Last office visit: 6/4/2024      Next Rheum Apt:1/30/2025 Dimple Rosenberg MD    Last fill: Humira 6/4/2024  2 each, 5 refills     Labs:   Lab Results   Component Value Date    CREATSERUM 1.00 12/04/2023    ALKPHO 87 12/04/2023    AST 37 12/04/2023    ALT 55 12/04/2023    BILT 0.5 12/04/2023    TP 7.9 12/04/2023    ALB 4.4 12/04/2023       Lab Results   Component Value Date    WBC 7.7 12/04/2023    HGB 13.7 12/04/2023    .0 12/04/2023    NEPRELIM 3.72 12/04/2023    NEPERCENT 48.2 12/04/2023    LYPERCENT 40.2 12/04/2023    NE 3.72 12/04/2023    LYMABS 3.10 12/04/2023

## 2025-01-08 DIAGNOSIS — M05.79 RHEUMATOID ARTHRITIS INVOLVING MULTIPLE SITES WITH POSITIVE RHEUMATOID FACTOR (HCC): ICD-10-CM

## 2025-01-08 DIAGNOSIS — M06.00 SERONEGATIVE RHEUMATOID ARTHRITIS (HCC): Primary | ICD-10-CM

## 2025-01-08 RX ORDER — ADALIMUMAB 40MG/0.4ML
40 KIT SUBCUTANEOUS
Qty: 2 EACH | Refills: 3 | Status: SHIPPED | OUTPATIENT
Start: 2025-01-08

## 2025-01-08 RX ORDER — METHOTREXATE 2.5 MG/1
12.5 TABLET ORAL WEEKLY
Qty: 65 TABLET | Refills: 0 | OUTPATIENT
Start: 2025-01-08

## 2025-01-08 NOTE — TELEPHONE ENCOUNTER
Last office visit: 6/4/24    Next Rheum Apt:1/30/2025 Dimple Rosenberg MD    Last fill:   Methotrexate: 11/11/24  Humira: 12/23/24    Labs:   Lab Results   Component Value Date    CREATSERUM 1.00 12/04/2023    ALKPHO 87 12/04/2023    AST 37 12/04/2023    ALT 55 12/04/2023    BILT 0.5 12/04/2023    TP 7.9 12/04/2023    ALB 4.4 12/04/2023       Lab Results   Component Value Date    WBC 7.7 12/04/2023    HGB 13.7 12/04/2023    .0 12/04/2023    NEPRELIM 3.72 12/04/2023    NEPERCENT 48.2 12/04/2023    LYPERCENT 40.2 12/04/2023    NE 3.72 12/04/2023    LYMABS 3.10 12/04/2023

## 2025-01-08 NOTE — TELEPHONE ENCOUNTER
Last labs scanned in 11/2024. Sending pt my chart message to remind them to get lab work done or fax over results

## 2025-01-30 ENCOUNTER — LAB ENCOUNTER (OUTPATIENT)
Dept: LAB | Age: 50
End: 2025-01-30
Attending: INTERNAL MEDICINE
Payer: COMMERCIAL

## 2025-01-30 ENCOUNTER — OFFICE VISIT (OUTPATIENT)
Dept: RHEUMATOLOGY | Facility: CLINIC | Age: 50
End: 2025-01-30
Payer: COMMERCIAL

## 2025-01-30 VITALS
TEMPERATURE: 98 F | WEIGHT: 153 LBS | HEIGHT: 64 IN | DIASTOLIC BLOOD PRESSURE: 80 MMHG | RESPIRATION RATE: 16 BRPM | SYSTOLIC BLOOD PRESSURE: 110 MMHG | HEART RATE: 75 BPM | OXYGEN SATURATION: 98 % | BODY MASS INDEX: 26.12 KG/M2

## 2025-01-30 DIAGNOSIS — Z79.899 ENCOUNTER FOR DRUG THERAPY: ICD-10-CM

## 2025-01-30 DIAGNOSIS — D84.9 IMMUNOCOMPROMISED (HCC): ICD-10-CM

## 2025-01-30 DIAGNOSIS — M06.00 SERONEGATIVE RHEUMATOID ARTHRITIS (HCC): ICD-10-CM

## 2025-01-30 DIAGNOSIS — M15.0 PRIMARY OSTEOARTHRITIS INVOLVING MULTIPLE JOINTS: ICD-10-CM

## 2025-01-30 DIAGNOSIS — M79.7 FIBROMYALGIA: ICD-10-CM

## 2025-01-30 DIAGNOSIS — M05.79 RHEUMATOID ARTHRITIS INVOLVING MULTIPLE SITES WITH POSITIVE RHEUMATOID FACTOR (HCC): Primary | ICD-10-CM

## 2025-01-30 DIAGNOSIS — M05.79 RHEUMATOID ARTHRITIS INVOLVING MULTIPLE SITES WITH POSITIVE RHEUMATOID FACTOR (HCC): ICD-10-CM

## 2025-01-30 LAB
ALBUMIN SERPL-MCNC: 4.7 G/DL (ref 3.2–4.8)
ALBUMIN/GLOB SERPL: 1.6 {RATIO} (ref 1–2)
ALP LIVER SERPL-CCNC: 77 U/L
ALT SERPL-CCNC: 18 U/L
ANION GAP SERPL CALC-SCNC: 8 MMOL/L (ref 0–18)
AST SERPL-CCNC: 24 U/L (ref ?–34)
BASOPHILS # BLD AUTO: 0.06 X10(3) UL (ref 0–0.2)
BASOPHILS NFR BLD AUTO: 0.6 %
BILIRUB SERPL-MCNC: 0.6 MG/DL (ref 0.3–1.2)
BUN BLD-MCNC: 17 MG/DL (ref 9–23)
CALCIUM BLD-MCNC: 9.6 MG/DL (ref 8.7–10.6)
CHLORIDE SERPL-SCNC: 103 MMOL/L (ref 98–112)
CO2 SERPL-SCNC: 26 MMOL/L (ref 21–32)
CREAT BLD-MCNC: 1.13 MG/DL
EGFRCR SERPLBLD CKD-EPI 2021: 60 ML/MIN/1.73M2 (ref 60–?)
EOSINOPHIL # BLD AUTO: 0.23 X10(3) UL (ref 0–0.7)
EOSINOPHIL NFR BLD AUTO: 2.4 %
ERYTHROCYTE [DISTWIDTH] IN BLOOD BY AUTOMATED COUNT: 13.2 %
ERYTHROCYTE [SEDIMENTATION RATE] IN BLOOD: 19 MM/HR
FASTING STATUS PATIENT QL REPORTED: YES
GLOBULIN PLAS-MCNC: 3 G/DL (ref 2–3.5)
GLUCOSE BLD-MCNC: 102 MG/DL (ref 70–99)
HCT VFR BLD AUTO: 39.1 %
HGB BLD-MCNC: 13.8 G/DL
IMM GRANULOCYTES # BLD AUTO: 0.03 X10(3) UL (ref 0–1)
IMM GRANULOCYTES NFR BLD: 0.3 %
LYMPHOCYTES # BLD AUTO: 3.79 X10(3) UL (ref 1–4)
LYMPHOCYTES NFR BLD AUTO: 39.4 %
MCH RBC QN AUTO: 33.7 PG (ref 26–34)
MCHC RBC AUTO-ENTMCNC: 35.3 G/DL (ref 31–37)
MCV RBC AUTO: 95.6 FL
MONOCYTES # BLD AUTO: 0.52 X10(3) UL (ref 0.1–1)
MONOCYTES NFR BLD AUTO: 5.4 %
NEUTROPHILS # BLD AUTO: 4.98 X10 (3) UL (ref 1.5–7.7)
NEUTROPHILS # BLD AUTO: 4.98 X10(3) UL (ref 1.5–7.7)
NEUTROPHILS NFR BLD AUTO: 51.9 %
OSMOLALITY SERPL CALC.SUM OF ELEC: 286 MOSM/KG (ref 275–295)
PLATELET # BLD AUTO: 362 10(3)UL (ref 150–450)
POTASSIUM SERPL-SCNC: 4.1 MMOL/L (ref 3.5–5.1)
PROT SERPL-MCNC: 7.7 G/DL (ref 5.7–8.2)
RBC # BLD AUTO: 4.09 X10(6)UL
SODIUM SERPL-SCNC: 137 MMOL/L (ref 136–145)
WBC # BLD AUTO: 9.6 X10(3) UL (ref 4–11)

## 2025-01-30 PROCEDURE — 3079F DIAST BP 80-89 MM HG: CPT | Performed by: INTERNAL MEDICINE

## 2025-01-30 PROCEDURE — 80053 COMPREHEN METABOLIC PANEL: CPT | Performed by: INTERNAL MEDICINE

## 2025-01-30 PROCEDURE — 3008F BODY MASS INDEX DOCD: CPT | Performed by: INTERNAL MEDICINE

## 2025-01-30 PROCEDURE — 85652 RBC SED RATE AUTOMATED: CPT | Performed by: INTERNAL MEDICINE

## 2025-01-30 PROCEDURE — 85025 COMPLETE CBC W/AUTO DIFF WBC: CPT | Performed by: INTERNAL MEDICINE

## 2025-01-30 PROCEDURE — G2211 COMPLEX E/M VISIT ADD ON: HCPCS | Performed by: INTERNAL MEDICINE

## 2025-01-30 PROCEDURE — 99214 OFFICE O/P EST MOD 30 MIN: CPT | Performed by: INTERNAL MEDICINE

## 2025-01-30 PROCEDURE — 3074F SYST BP LT 130 MM HG: CPT | Performed by: INTERNAL MEDICINE

## 2025-01-30 PROCEDURE — 86480 TB TEST CELL IMMUN MEASURE: CPT | Performed by: INTERNAL MEDICINE

## 2025-01-30 RX ORDER — FOLIC ACID 1 MG/1
1 TABLET ORAL DAILY
Qty: 90 TABLET | Refills: 3 | Status: SHIPPED | OUTPATIENT
Start: 2025-01-30

## 2025-01-30 RX ORDER — METHOTREXATE 2.5 MG/1
12.5 TABLET ORAL WEEKLY
Qty: 65 TABLET | Refills: 0 | Status: SHIPPED | OUTPATIENT
Start: 2025-01-30

## 2025-01-30 RX ORDER — METHOTREXATE 2.5 MG/1
12.5 TABLET ORAL WEEKLY
Qty: 65 TABLET | Refills: 0 | Status: SHIPPED | OUTPATIENT
Start: 2025-01-30 | End: 2025-01-30

## 2025-01-30 RX ORDER — PHENTERMINE HYDROCHLORIDE 37.5 MG/1
1 TABLET ORAL DAILY
COMMUNITY
Start: 2024-08-19

## 2025-01-30 RX ORDER — ADALIMUMAB 40MG/0.4ML
40 KIT SUBCUTANEOUS
Qty: 2 EACH | Refills: 3 | Status: SHIPPED | OUTPATIENT
Start: 2025-01-30

## 2025-01-30 RX ORDER — DULOXETIN HYDROCHLORIDE 60 MG/1
60 CAPSULE, DELAYED RELEASE ORAL DAILY
Qty: 90 CAPSULE | Refills: 1 | Status: SHIPPED | OUTPATIENT
Start: 2025-01-30

## 2025-01-30 RX ORDER — MELOXICAM 15 MG/1
15 TABLET ORAL DAILY
Qty: 90 TABLET | Refills: 1 | Status: SHIPPED | OUTPATIENT
Start: 2025-01-30

## 2025-01-30 NOTE — PROGRESS NOTES
[facility], [address] [place] [address]      Consult     Melissa Bowman Patient Status:  No patient class for patient encounter    3/28/1975 MRN [id number]   Location [facility], [address] [place] [address] Attending No att. providers found   Hosp Day # 0 PCP Johnnie Walker DO     Referring Provider: PCP    Reason for Consultation: Seropositive rheumatoid arthritis; osteoarthritis; fibromyalgia    Subjective:    Melissa Bowman is a 49 year old female with     HPI:Seropositive +RF, +CCP Rheumatoid Arthritis, Osteoarthritis, Fibromyalgia    49-year-old woman comes in for followup for seropositive RA and osteoarthritis and fibromyalgia.     Patient was last seen in clinic 2024    Continued on Humira 40 mg subcu every other week and methotrexate 15 mg once a week    She has been doing well overall.  She is frustrated we are not getting the labs from [facility] but she states she had them 2024 and is requesting a refill of methotrexate new standing labs given the patient    Overall no flare ups of her rheumatoid arthritis.     Previously given trazodone but she never tried it with Cymbalta 60 mg daily because of fear of side effects    She never ended up seeing a psychiatrist or therapist.    Continues to have stressors with nonrestorative sleep fatigue and some neuropathies because of ongoing issues with child support and tax issues with her previous ex- from 2018 as she is fighting with the [employer]    She states no flareups of her rheumatoid arthritis itself.     At that time she continued on Humira 40 subcutaneous every other week and methotrexate 15mg once a week.     She states somehow she has been getting her medications despite no labs since last visit    X-rays of the hands and feet showed stable arthritic changes 2023 chest x-ray showed atelectasis patient had no signs or symptoms of infection.  Continues to do well without any recent  infections shortness of breath or chest pain    Denies any fevers or chills    Reminded patient to update labs shortly.  New standing labs given to patient    She was also continued on Cymbalta 60 mg daily.    She takes them her meloxicam periodically     She is not having any major flareups since last visit.    SHe states no infection since last visit.     Previously she was also diagnosed with ADHD depression and anxiety to her PCP.     Denies Any suicidal ideation. Her left shoulder has been doing well. When this flares up she sees her orthopedic surgeon.    She has not had a shoulder injection quite. MRI did reveal previously moderate a.c. joint arthritis.     Denies any swelling or pain of her other joints.     Her rheumatoid arthritis has been stable. Denies any fevers or chills no shortness breath or chest pain. Denies any infection since last visit. Her weight has been stable.    As for her RA, she otherwise feels she is stable. Last Vectra was in low disease activity range at 28 in December 2016. She denies swelling in pain in her other joints.     She historically has had positive RF and CCP antibodies and negative KENYON.     History/Other:        Past Medical History:  Past Medical History:    Fibromyalgia    Rheumatoid arthritis (HCC)        Past Surgical History: History reviewed. No pertinent surgical history.    Social History:  reports that she has quit smoking. She has never used smokeless tobacco. She reports current alcohol use. She reports that she does not use drugs.    Family History: History reviewed. No pertinent family history.    Allergies:   Allergies   Allergen Reactions    Sulfa Antibiotics HIVES, PALPITATIONS, SHORTNESS OF BREATH and UNKNOWN     Other reaction(s): Palpitations       Current Medications:  Current Outpatient Medications   Medication Sig Dispense Refill    Phentermine HCl 37.5 MG Oral Tab Take 1 tablet (37.5 mg total) by mouth daily.      MAGNESIUM OR Take by mouth daily.       Cholecalciferol (VITAMIN D-3 OR) Take by mouth daily.      DULoxetine 60 MG Oral Cap DR Particles Take 1 capsule (60 mg total) by mouth daily. 90 capsule 1    methotrexate 2.5 MG Oral Tab Take 5 tablets (12.5 mg total) by mouth once a week. 65 tablet 0    folic acid 1 MG Oral Tab Take 1 tablet (1 mg total) by mouth daily. 90 tablet 3    Meloxicam 15 MG Oral Tab Take 1 tablet (15 mg total) by mouth daily. 90 tablet 1    Adalimumab (HUMIRA, 2 PEN,) 40 MG/0.4ML Subcutaneous Auto-injector Kit Inject 40 mg into the skin every 14 (fourteen) days. 2 each 3    cetirizine (ZYRTEC ALLERGY) 10 MG Oral Tab Take 1 tablet (10 mg total) by mouth daily.        No current facility-administered medications for this visit.       (Not in a hospital admission)      Review of Systems:     Constitutional: Negative for chills, , fatigue, fever and unexpected weight change.    HENT: Negative for congestion, and mouth sores.    Eyes: Negative for photophobia, pain, redness and visual disturbance.    Respiratory: Negative for apnea, cough, chest tightness, shortness of breath, wheezing and stridor.    Cardiovascular: Negative for chest pain, palpitations and leg swelling.    Gastrointestinal: Negative for abdominal distention, abdominal pain, blood in stool, constipation, diarrhea and nausea.    Endocrine: Negative.     Genitourinary: Negative for decreased urine volume, difficulty urinating, dyspareunia, dysuria, flank pain, and frequency.    Musculoskeletal: Occasional for arthralgias, no gait problem and joint swelling.    Skin: Negative for color change, pallor and rash. No raynauds or digital ulcerations no sclerodactly.    Allergic/Immunologic: Negative.    Neurological: Negative for dizziness, tremors, seizures, syncope, speech difficulty, weakness, light-headedness, numbness and headaches.    Hematological: Does not bruise/bleed easily.    Psychiatric/Behavioral: Negative for confusion, decreased concentration, hallucinations,  self-injury, sleep disturbance and suicidal ideas or depression.    Objective:   [unfilled]  Vitals:    01/30/25 1241   BP: 110/80   Pulse: 75   Resp: 16   Temp: 98.2 °F (36.8 °C)          Constitutional: is oriented to person, place, and time. Appears well-developed and well-nourished. No distress.    HEENT: Normocephalic; EOMI; no jvd; no LAD; no oral or nasal ulcers.     Eyes: Conjunctivae and EOM are normal. Pupils are equal, round, and reactive to light.     Neck: Normal range of motion. No thyromegaly present.    Cardiovascular: RRR, no murmurs.    Lungs: Clear, Bilateral air entry, no wheezes.    Abdominal: Soft.    Musculoskeletal:    There is currently no information documented on the homunculus. Go to the Rheumatology activity and complete the homunculus joint exam.     Joint Exam 01/30/2025     No joint exam has been documented for this visit        Swollen: -- 0    Tender: -- 0        Right shoulder: Exhibits normal range of motion on abduction and internal rotation, no tenderness, no bony tenderness, no deformity, no laceration, no pain and no spasm.        Left shoulder: Exhibits normal range of motion on abduction and internal and external rotation.  no tenderness, no bony tenderness, no swelling, no effusion, no deformity, no pain, no spasm and normal strength.        Right elbow:  Exhibits normal range of motion, no swelling, no effusion and no deformity. No tenderness found. No medial epicondyle, no lateral epicondyle and no olecranon process tenderness noted. There are no contractures or tophi or nodules.        Left elbow:  Normal range of motion, no swelling, no effusion and no deformity. No medial epicondyle, no lateral epicondyle and no olecranon process tenderness noted. There are no contractures or tophi or nodules.        Right wrist:  Exhibits normal range of motion, no tenderness, no bony tenderness, no swelling, no effusion and no crepitus. Flexion and extension intact w/o  limitation.        Left wrist: Exhibits normal range of motion, no tenderness, no bony tenderness, no swelling, no effusion, no crepitus and no deformity. Flexion and extension intact without limitation.        Right hip: Exhibits normal range of motion, normal strength, no tenderness, no bony tenderness, no swelling and no crepitus.        Right hand: No synovitis of MCP,PIP or DIP joints; no Bouchards or Heberden nodules noted;  strength: 100%.  Mild squaring first CMC joint        Left hand: No synovitis of MCP,PIP or DIP joints; no Bouchards or Heberden nodules noted;  strength: 100%.  Mild squaring first CMC joint        Left hip: Exhibits normal range of motion, normal strength, no tenderness, no bony tenderness, No swelling and no crepitus.        Right knee: Exhibits normal range of motion, no swelling, no effusion, no ecchymosis, no deformity and no erythema. No tenderness found. No medial joint line, no lateral joint line, no MCL and no LCL tenderness noted. mild crepitation on flexion of knee and extension normal.        Left knee:  Exhibits normal range of motion, no swelling, no effusion, no ecchymosis and no erythema. No tenderness found. No medial joint line, no lateral joint line and no patellar tendon tenderness noted. mild crepitation on flexion of the knee. Extension intact and normal.        Right ankle: No swelling, no deformity. No tenderness. Dorsiflexion and plantar flexion intact without limitation in range of motion.        Left ankle: Exhibits no swelling. No tenderness. No lateral malleolus and no medial malleolus tenderness found. Achilles tendon normal. Achilles tendon exhibits no pain, no defect and normal Echeverria's test results.  Dorsiflexion and plantar flexion intact without limitation in range of motion.        Cervical back: Exhibits normal range of motion, no tenderness, no bony tenderness, no swelling, no pain and no spasm.        Thoracic back: Exhibits normal range of  motion, no tenderness, no bony tenderness and no spasm.        Lumbar back:  Exhibits normal range of motion, no tenderness, no bony tenderness, no pain and no spasm.        Right foot: normal. There is normal range of motion, no tenderness, no bony tenderness, no crepitus and no laceration. There is no synovitis or tenderness of the MTP joints to palpation.  Bony enlargement first MTP joint        Left foot: normal. There is normal range of motion, no tenderness, no bony tenderness and no crepitus. There is no synovitis or tenderness of the MTP joints to palpation.  Bony enlargement first MTP joint    Lymphadenopathy: No submental, no submandibular, and no occipital adenopathy present, has no cervical adenopathy or axillary lympadenopathy.    Neurological: Alert and oriented. No focal motor or sensory abnormalities. Strength is 5/5 Upper Extremities/Lower Extremities proximally and distally.    Skin: Skin is warm, dry and intact.  No rashes no purpuric petechial lesions    Psychiatric: Normal behavior.    Results:    Labs:      Lab Results   Component Value Date    WBC 7.7 12/04/2023    RBC 4.14 12/04/2023    HGB 13.7 12/04/2023    HCT 40.6 12/04/2023    MCV 98.1 12/04/2023    MCH 33.1 12/04/2023    MCHC 33.7 12/04/2023    RDW 13.1 12/04/2023    .0 12/04/2023       No components found for: \"RELY\", \"NMET\", \"MYEL\", \"PROMY\", \"SUNIL\", \"ABSNEUTS\", \"ABSBANDS\", \"ABMM\", \"ABMY\", \"ABPM\", \"ABBL\"      Lab Results   Component Value Date     12/04/2023    K 4.4 12/04/2023    CO2 26.0 12/04/2023    BUN 17 12/04/2023    ALB 4.4 12/04/2023    AST 37 12/04/2023    ALT 55 12/04/2023          No components found for: \"ESRWESTERGRN\"       No results found for: \"CRP\"      No results found for: \"COLOR\", \"CLARITY\", \"UROBILINOGEN\", \"YEAST\"  @LABRCNTIP(RF,B12)@      [unfilled]    Imaging:  No results found.    MRI 6/17: left shoulder:    X rays foot 11/15:    X ray hands 11/15:    Chest x ray 8/15:      Assessment & Plan:       Seropositive rheumatoid arthritis  Osteoarthritis  Fibromyalgia with nonrestorative sleep fatigue depression neuropathies insomnia  Encounter for therapeutic drug monitoring    Patient has no synovitis on exam.  Continue Humira 40 mg subcutaneous every other week  continue methotrexate to 15mg a week   Continue monitoring labs every 8-12 weeks for drug toxicity.  Labs reviewed November 2024 CBC CMP sed rate normal.  States she had labs January 2025  New standing labs given to patient  Patient states she has been taking methotrexate but unclear when last labs were.  Discussed the importance of monitoring for toxicity  Mainly chronic changes of the right foot and MTP joints. She does have significant calcaneal spurs based on x-rays. She had no erosive changes.   Hand x-rays showing stable arthritic changes.  Chest x-ray showing atelectasis no signs symptomatically of infection.  From December 2023  Discussed the importance of compliance to prevent long term joint damage     She is having a lot of financial issues at this time from previous divorce    He had false positive hCG testing but there is no evidence she was pregnant she is hoping to get her colonoscopy shortly after this is cleared up    Recent death of her dad coping better more recently    Generalized osteoarthritis: Continue Tylenol.    Bone health: She is still having periods. She is not postmenopausal.    Chronic plantar fasciitis improving with intervention stretching and foot inserts. Responded to Medrol Dosepak in the past    Myofascial pain syndrome//fibromyalgia: Continue Cymbalta 60 mg daily.     recommend she see a psychiatrist for management of her depression, anxiety and ADHD.   Appears to be better controlled. No insomnia is the biggest problem       Education and counseling provided to patient.  Instructed patient to call my office or seek medical attention immediately if symptoms worsen. Risks and side effects of medications and diagnosis  discussed in detail and patient was given written information on new prescribed medications.    Return to clinic:  Return in about 6 months (around 7/30/2025).    Dimple Rosenberg MD  12/4/2023

## 2025-02-01 LAB
M TB IFN-G CD4+ T-CELLS BLD-ACNC: 0.03 IU/ML
M TB TUBERC IFN-G BLD QL: NEGATIVE
M TB TUBERC IGNF/MITOGEN IGNF CONTROL: >10 IU/ML
QFT TB1 AG MINUS NIL: 0.01 IU/ML
QFT TB2 AG MINUS NIL: 0 IU/ML

## 2025-02-06 ENCOUNTER — APPOINTMENT (OUTPATIENT)
Dept: URBAN - METROPOLITAN AREA CLINIC 315 | Age: 50
Setting detail: DERMATOLOGY
End: 2025-02-06

## 2025-02-06 DIAGNOSIS — R21 RASH AND OTHER NONSPECIFIC SKIN ERUPTION: ICD-10-CM

## 2025-02-06 PROCEDURE — OTHER COUNSELING: OTHER

## 2025-02-06 PROCEDURE — OTHER ADDITIONAL NOTES: OTHER

## 2025-02-06 PROCEDURE — OTHER ORDER TESTS: OTHER

## 2025-02-06 PROCEDURE — OTHER PRESCRIPTION: OTHER

## 2025-02-06 PROCEDURE — 99204 OFFICE O/P NEW MOD 45 MIN: CPT

## 2025-02-06 PROCEDURE — OTHER MIPS QUALITY: OTHER

## 2025-02-06 PROCEDURE — OTHER PRESCRIPTION MEDICATION MANAGEMENT: OTHER

## 2025-02-06 RX ORDER — DOXYCYCLINE HYCLATE 100 MG/1
CAPSULE, GELATIN COATED ORAL
Qty: 14 | Refills: 0 | Status: ERX | COMMUNITY
Start: 2025-02-06

## 2025-02-06 ASSESSMENT — LOCATION ZONE DERM
LOCATION ZONE: NECK
LOCATION ZONE: FACE

## 2025-02-06 ASSESSMENT — LOCATION DETAILED DESCRIPTION DERM
LOCATION DETAILED: LEFT POSTERIOR NECK
LOCATION DETAILED: RIGHT POSTERIOR NECK
LOCATION DETAILED: LEFT INFERIOR MEDIAL FOREHEAD

## 2025-02-06 ASSESSMENT — LOCATION SIMPLE DESCRIPTION DERM
LOCATION SIMPLE: POSTERIOR NECK
LOCATION SIMPLE: LEFT FOREHEAD

## 2025-02-06 ASSESSMENT — BSA RASH: BSA RASH: 3

## 2025-02-06 NOTE — PROCEDURE: PRESCRIPTION MEDICATION MANAGEMENT
Samples Given: Cerave and Cetaphil cream.\\nCerave and Cetaphil cleansers\\nAquaphor
Detail Level: Zone
Initiate Treatment: Doxycycline 100mg BID x 7 days
Render In Strict Bullet Format?: Yes

## 2025-02-06 NOTE — HPI: RASH
What Type Of Note Output Would You Prefer (Optional)?: Bullet Format
How Severe Is Your Rash?: moderate
Is This A New Presentation, Or A Follow-Up?: Rash
Additional History: Neck rash has been present since Thanksgiving. Facial rash is new

## 2025-02-06 NOTE — PROCEDURE: ORDER TESTS
Bill For Surgical Tray: no
Billing Type: Third-Party Bill
Performing Laboratory: 0
Expected Date Of Service: 02/06/2025

## 2025-02-10 ENCOUNTER — TELEPHONE (OUTPATIENT)
Dept: RHEUMATOLOGY | Facility: CLINIC | Age: 50
End: 2025-02-10

## 2025-02-10 DIAGNOSIS — M06.00 SERONEGATIVE RHEUMATOID ARTHRITIS (HCC): ICD-10-CM

## 2025-02-10 DIAGNOSIS — M05.79 RHEUMATOID ARTHRITIS INVOLVING MULTIPLE SITES WITH POSITIVE RHEUMATOID FACTOR (HCC): ICD-10-CM

## 2025-02-10 RX ORDER — METHOTREXATE 2.5 MG/1
10 TABLET ORAL WEEKLY
Qty: 48 TABLET | Refills: 0 | Status: SHIPPED | OUTPATIENT
Start: 2025-02-10

## 2025-02-10 NOTE — TELEPHONE ENCOUNTER
Component  Ref Range & Units 11 d ago 1 yr ago   Glucose  70 - 99 mg/dL 102 High  94   Sodium  136 - 145 mmol/L 137 136   Potassium  3.5 - 5.1 mmol/L 4.1 4.4   Chloride  98 - 112 mmol/L 103 106   CO2  21.0 - 32.0 mmol/L 26.0 26.0   Anion Gap  0 - 18 mmol/L 8 4   BUN  9 - 23 mg/dL 17 17   Creatinine  0.55 - 1.02 mg/dL 1.13 High  1.00   Calcium, Total  8.7 - 10.6 mg/dL 9.6           Creatinine is 1.13    I went to reduce methotrexate to 10 mg once a week    Avoid NSAIDs alcohol use repeat CMP in 4 weeks to make sure it normalizes stay hydrated

## 2025-02-12 ENCOUNTER — APPOINTMENT (OUTPATIENT)
Dept: URBAN - METROPOLITAN AREA CLINIC 317 | Age: 50
Setting detail: DERMATOLOGY
End: 2025-02-20

## 2025-02-12 DIAGNOSIS — L73.9 FOLLICULAR DISORDER, UNSPECIFIED: ICD-10-CM

## 2025-02-12 PROBLEM — L02.02 FURUNCLE OF FACE: Status: ACTIVE | Noted: 2025-02-12

## 2025-02-12 PROBLEM — L02.12 FURUNCLE OF NECK: Status: ACTIVE | Noted: 2025-02-12

## 2025-02-12 PROCEDURE — OTHER MIPS QUALITY: OTHER

## 2025-02-12 PROCEDURE — 99213 OFFICE O/P EST LOW 20 MIN: CPT

## 2025-02-12 PROCEDURE — OTHER PRESCRIPTION MEDICATION MANAGEMENT: OTHER

## 2025-02-12 PROCEDURE — OTHER ADDITIONAL NOTES: OTHER

## 2025-02-12 PROCEDURE — OTHER PRESCRIPTION: OTHER

## 2025-02-12 PROCEDURE — OTHER COUNSELING: OTHER

## 2025-02-12 RX ORDER — DOXYCYCLINE HYCLATE 100 MG/1
CAPSULE, GELATIN COATED ORAL
Qty: 60 | Refills: 0 | Status: ERX

## 2025-02-12 RX ORDER — MUPIROCIN 20 MG/G
OINTMENT TOPICAL
Qty: 22 | Refills: 0 | Status: ERX | COMMUNITY
Start: 2025-02-12

## 2025-02-12 RX ORDER — KETOCONAZOLE 20 MG/G
CREAM TOPICAL
Qty: 60 | Refills: 2 | Status: ERX | COMMUNITY
Start: 2025-02-12

## 2025-02-12 ASSESSMENT — LOCATION DETAILED DESCRIPTION DERM
LOCATION DETAILED: LEFT INFERIOR MEDIAL FOREHEAD
LOCATION DETAILED: LEFT POSTERIOR NECK
LOCATION DETAILED: RIGHT POSTERIOR NECK

## 2025-02-12 ASSESSMENT — LOCATION SIMPLE DESCRIPTION DERM
LOCATION SIMPLE: LEFT FOREHEAD
LOCATION SIMPLE: POSTERIOR NECK

## 2025-02-12 ASSESSMENT — LOCATION ZONE DERM
LOCATION ZONE: FACE
LOCATION ZONE: NECK

## 2025-02-12 NOTE — PROCEDURE: PRESCRIPTION MEDICATION MANAGEMENT
Samples Given: Cerave and Cetaphil cream.\\nCerave and Cetaphil cleansers\\nAquaphor
Continue Regimen: Doxycycline 100mg BID x1 month
Detail Level: Zone
Initiate Treatment: Ketoconazole 2% cream QD
Render In Strict Bullet Format?: Yes

## 2025-02-12 NOTE — PROCEDURE: ADDITIONAL NOTES
Render Risk Assessment In Note?: no
Additional Notes: No vision changes or ocular symptoms. Mild radiating sinus type headache; off and on for a few weeks.
Detail Level: Simple

## 2025-03-13 ENCOUNTER — APPOINTMENT (OUTPATIENT)
Dept: URBAN - METROPOLITAN AREA CLINIC 317 | Age: 50
Setting detail: DERMATOLOGY
End: 2025-03-13

## 2025-03-13 DIAGNOSIS — L73.9 FOLLICULAR DISORDER, UNSPECIFIED: ICD-10-CM

## 2025-03-13 PROCEDURE — 99213 OFFICE O/P EST LOW 20 MIN: CPT

## 2025-03-13 PROCEDURE — OTHER MIPS QUALITY: OTHER

## 2025-03-13 PROCEDURE — OTHER COUNSELING: OTHER

## 2025-03-13 PROCEDURE — OTHER PRESCRIPTION MEDICATION MANAGEMENT: OTHER

## 2025-03-13 ASSESSMENT — LOCATION ZONE DERM
LOCATION ZONE: NECK
LOCATION ZONE: FACE

## 2025-03-13 ASSESSMENT — LOCATION SIMPLE DESCRIPTION DERM
LOCATION SIMPLE: LEFT FOREHEAD
LOCATION SIMPLE: POSTERIOR NECK

## 2025-03-13 NOTE — PROCEDURE: PRESCRIPTION MEDICATION MANAGEMENT
Continue Regimen: Ketoconazole 2% cream 2-3x weekly, titrating down as tolerated \\n\\nmupirocin 2 % topical ointment \\nAdd small amount to each nostril 2-3x daily x 1 week, repeat once monthly \\n\\nDoxycycline 100mg - has been taking this daily - reduced to QOD x 2 weeks then dc
Detail Level: Zone
Render In Strict Bullet Format?: Yes

## 2025-04-22 ENCOUNTER — LAB ENCOUNTER (OUTPATIENT)
Dept: LAB | Age: 50
End: 2025-04-22
Attending: INTERNAL MEDICINE
Payer: COMMERCIAL

## 2025-04-22 DIAGNOSIS — M05.79 RHEUMATOID ARTHRITIS INVOLVING MULTIPLE SITES WITH POSITIVE RHEUMATOID FACTOR (HCC): ICD-10-CM

## 2025-04-22 LAB
ALBUMIN SERPL-MCNC: 4.7 G/DL (ref 3.2–4.8)
ALBUMIN/GLOB SERPL: 1.7 {RATIO} (ref 1–2)
ALP LIVER SERPL-CCNC: 101 U/L (ref 39–100)
ALT SERPL-CCNC: 53 U/L (ref 10–49)
ANION GAP SERPL CALC-SCNC: 10 MMOL/L (ref 0–18)
AST SERPL-CCNC: 41 U/L (ref ?–34)
BASOPHILS # BLD AUTO: 0.05 X10(3) UL (ref 0–0.2)
BASOPHILS NFR BLD AUTO: 0.7 %
BILIRUB SERPL-MCNC: 0.7 MG/DL (ref 0.3–1.2)
BUN BLD-MCNC: 17 MG/DL (ref 9–23)
CALCIUM BLD-MCNC: 9.7 MG/DL (ref 8.7–10.6)
CHLORIDE SERPL-SCNC: 105 MMOL/L (ref 98–112)
CO2 SERPL-SCNC: 25 MMOL/L (ref 21–32)
CREAT BLD-MCNC: 1.13 MG/DL (ref 0.55–1.02)
EGFRCR SERPLBLD CKD-EPI 2021: 60 ML/MIN/1.73M2 (ref 60–?)
EOSINOPHIL # BLD AUTO: 0.27 X10(3) UL (ref 0–0.7)
EOSINOPHIL NFR BLD AUTO: 4 %
ERYTHROCYTE [DISTWIDTH] IN BLOOD BY AUTOMATED COUNT: 14.1 %
ERYTHROCYTE [SEDIMENTATION RATE] IN BLOOD: 22 MM/HR (ref 0–20)
FASTING STATUS PATIENT QL REPORTED: NO
GLOBULIN PLAS-MCNC: 2.8 G/DL (ref 2–3.5)
GLUCOSE BLD-MCNC: 64 MG/DL (ref 70–99)
HCT VFR BLD AUTO: 40.8 % (ref 35–48)
HGB BLD-MCNC: 13.8 G/DL (ref 12–16)
IMM GRANULOCYTES # BLD AUTO: 0.01 X10(3) UL (ref 0–1)
IMM GRANULOCYTES NFR BLD: 0.1 %
LYMPHOCYTES # BLD AUTO: 2.06 X10(3) UL (ref 1–4)
LYMPHOCYTES NFR BLD AUTO: 30.2 %
MCH RBC QN AUTO: 33.3 PG (ref 26–34)
MCHC RBC AUTO-ENTMCNC: 33.8 G/DL (ref 31–37)
MCV RBC AUTO: 98.6 FL (ref 80–100)
MONOCYTES # BLD AUTO: 0.52 X10(3) UL (ref 0.1–1)
MONOCYTES NFR BLD AUTO: 7.6 %
NEUTROPHILS # BLD AUTO: 3.91 X10 (3) UL (ref 1.5–7.7)
NEUTROPHILS # BLD AUTO: 3.91 X10(3) UL (ref 1.5–7.7)
NEUTROPHILS NFR BLD AUTO: 57.4 %
OSMOLALITY SERPL CALC.SUM OF ELEC: 290 MOSM/KG (ref 275–295)
PLATELET # BLD AUTO: 387 10(3)UL (ref 150–450)
POTASSIUM SERPL-SCNC: 4.6 MMOL/L (ref 3.5–5.1)
PROT SERPL-MCNC: 7.5 G/DL (ref 5.7–8.2)
RBC # BLD AUTO: 4.14 X10(6)UL (ref 3.8–5.3)
SODIUM SERPL-SCNC: 140 MMOL/L (ref 136–145)
VIT D+METAB SERPL-MCNC: 46.2 NG/ML (ref 30–100)
WBC # BLD AUTO: 6.8 X10(3) UL (ref 4–11)

## 2025-04-22 PROCEDURE — 82306 VITAMIN D 25 HYDROXY: CPT | Performed by: INTERNAL MEDICINE

## 2025-04-22 PROCEDURE — 85025 COMPLETE CBC W/AUTO DIFF WBC: CPT | Performed by: INTERNAL MEDICINE

## 2025-04-22 PROCEDURE — 85652 RBC SED RATE AUTOMATED: CPT | Performed by: INTERNAL MEDICINE

## 2025-04-22 PROCEDURE — 80053 COMPREHEN METABOLIC PANEL: CPT | Performed by: INTERNAL MEDICINE

## 2025-04-23 ENCOUNTER — TELEPHONE (OUTPATIENT)
Dept: RHEUMATOLOGY | Facility: CLINIC | Age: 50
End: 2025-04-23

## 2025-04-23 DIAGNOSIS — M05.79 RHEUMATOID ARTHRITIS INVOLVING MULTIPLE SITES WITH POSITIVE RHEUMATOID FACTOR (HCC): Primary | ICD-10-CM

## 2025-04-23 NOTE — TELEPHONE ENCOUNTER
Component  Ref Range & Units (hover) 1 d ago 2 mo ago 1 yr ago   Glucose 64 Low  102 High  94   Sodium 140 137 136   Potassium 4.6 4.1 4.4   Chloride 105 103 106   CO2 25.0 26.0 26.0   Anion Gap 10 8 4   BUN 17 17 17   Creatinine 1.13 High  1.13 High  1.00   Calcium, Total 9.7 9.6 9.3 R   Calculated Osmolality 290 286 283   eGFR-Cr 60 60 70   Comment: eGRF calculated using the CKD-EPI 2021 calculation   AST 41 High  24 37 R   ALT 53 High  18 55 R   Alkaline Phosphatase 101 High          Her liver tests are much higher    Any recent alcohol use    Hold methotrexate for the next 4 weeks  Repeat CMP in 4 weeks  Confirm dosing of methotrexate  Any new medications?  Decide further taper if labs normalize  Minimize meloxicam  Avoid alcohol

## 2025-05-13 ENCOUNTER — APPOINTMENT (OUTPATIENT)
Dept: URBAN - METROPOLITAN AREA CLINIC 317 | Age: 50
Setting detail: DERMATOLOGY
End: 2025-05-13

## 2025-05-13 DIAGNOSIS — Z71.89 OTHER SPECIFIED COUNSELING: ICD-10-CM

## 2025-05-13 DIAGNOSIS — D22 MELANOCYTIC NEVI: ICD-10-CM

## 2025-05-13 DIAGNOSIS — D18.0 HEMANGIOMA: ICD-10-CM

## 2025-05-13 DIAGNOSIS — L82.1 OTHER SEBORRHEIC KERATOSIS: ICD-10-CM

## 2025-05-13 DIAGNOSIS — D49.2 NEOPLASM OF UNSPECIFIED BEHAVIOR OF BONE, SOFT TISSUE, AND SKIN: ICD-10-CM

## 2025-05-13 DIAGNOSIS — L81.4 OTHER MELANIN HYPERPIGMENTATION: ICD-10-CM

## 2025-05-13 DIAGNOSIS — L57.8 OTHER SKIN CHANGES DUE TO CHRONIC EXPOSURE TO NONIONIZING RADIATION: ICD-10-CM

## 2025-05-13 DIAGNOSIS — L73.9 FOLLICULAR DISORDER, UNSPECIFIED: ICD-10-CM

## 2025-05-13 PROBLEM — D22.5 MELANOCYTIC NEVI OF TRUNK: Status: ACTIVE | Noted: 2025-05-13

## 2025-05-13 PROBLEM — D18.01 HEMANGIOMA OF SKIN AND SUBCUTANEOUS TISSUE: Status: ACTIVE | Noted: 2025-05-13

## 2025-05-13 PROCEDURE — OTHER BIOPSY BY SHAVE METHOD: OTHER

## 2025-05-13 PROCEDURE — OTHER COUNSELING: OTHER

## 2025-05-13 PROCEDURE — OTHER PHOTO-DOCUMENTATION: OTHER

## 2025-05-13 PROCEDURE — OTHER PRESCRIPTION MEDICATION MANAGEMENT: OTHER

## 2025-05-13 PROCEDURE — OTHER PRESCRIPTION: OTHER

## 2025-05-13 PROCEDURE — 11102 TANGNTL BX SKIN SINGLE LES: CPT

## 2025-05-13 PROCEDURE — 99213 OFFICE O/P EST LOW 20 MIN: CPT | Mod: 25

## 2025-05-13 PROCEDURE — OTHER SUNSCREEN RECOMMENDATIONS: OTHER

## 2025-05-13 PROCEDURE — 11103 TANGNTL BX SKIN EA SEP/ADDL: CPT

## 2025-05-13 PROCEDURE — OTHER MIPS QUALITY: OTHER

## 2025-05-13 RX ORDER — CLINDAMYCIN PHOSPHATE 10 MG/G
GEL TOPICAL
Qty: 60 | Refills: 3 | Status: ERX | COMMUNITY
Start: 2025-05-13

## 2025-05-13 ASSESSMENT — LOCATION ZONE DERM
LOCATION ZONE: TRUNK
LOCATION ZONE: ARM
LOCATION ZONE: TRUNK
LOCATION ZONE: FACE

## 2025-05-13 ASSESSMENT — LOCATION SIMPLE DESCRIPTION DERM
LOCATION SIMPLE: LEFT CHEEK
LOCATION SIMPLE: CHEST
LOCATION SIMPLE: LEFT BUTTOCK
LOCATION SIMPLE: LEFT SHOULDER
LOCATION SIMPLE: CHEST
LOCATION SIMPLE: RIGHT UPPER BACK
LOCATION SIMPLE: RIGHT BUTTOCK
LOCATION SIMPLE: ABDOMEN

## 2025-05-13 ASSESSMENT — LOCATION DETAILED DESCRIPTION DERM
LOCATION DETAILED: RIGHT MID-UPPER BACK
LOCATION DETAILED: RIGHT BUTTOCK
LOCATION DETAILED: RIGHT MEDIAL SUPERIOR CHEST
LOCATION DETAILED: LEFT LATERAL SUPERIOR CHEST
LOCATION DETAILED: LEFT BUTTOCK
LOCATION DETAILED: LEFT POSTERIOR SHOULDER
LOCATION DETAILED: LEFT MEDIAL SUPERIOR CHEST
LOCATION DETAILED: RIGHT RIB CAGE
LOCATION DETAILED: LEFT INFERIOR CENTRAL MALAR CHEEK
LOCATION DETAILED: RIGHT LATERAL SUPERIOR CHEST
LOCATION DETAILED: RIGHT LATERAL ABDOMEN
LOCATION DETAILED: LEFT LATERAL SUPERIOR CHEST
LOCATION DETAILED: MIDDLE STERNUM

## 2025-05-24 ENCOUNTER — PATIENT MESSAGE (OUTPATIENT)
Dept: RHEUMATOLOGY | Facility: CLINIC | Age: 50
End: 2025-05-24

## 2025-05-27 ENCOUNTER — TELEPHONE (OUTPATIENT)
Dept: RHEUMATOLOGY | Facility: CLINIC | Age: 50
End: 2025-05-27

## 2025-05-27 ENCOUNTER — TELEPHONE (OUTPATIENT)
Facility: CLINIC | Age: 50
End: 2025-05-27

## 2025-05-27 NOTE — TELEPHONE ENCOUNTER
Called patient to inform her that the appointment for tomorrow with Dr. Rosenberg is no longer available.  We may be able to get her in next week and to please call the office.

## 2025-05-29 ENCOUNTER — TELEPHONE (OUTPATIENT)
Dept: RHEUMATOLOGY | Facility: CLINIC | Age: 50
End: 2025-05-29

## 2025-05-29 ENCOUNTER — LAB ENCOUNTER (OUTPATIENT)
Dept: LAB | Age: 50
End: 2025-05-29
Attending: INTERNAL MEDICINE
Payer: COMMERCIAL

## 2025-05-29 DIAGNOSIS — M05.79 RHEUMATOID ARTHRITIS INVOLVING MULTIPLE SITES WITH POSITIVE RHEUMATOID FACTOR (HCC): ICD-10-CM

## 2025-05-29 DIAGNOSIS — M15.0 PRIMARY OSTEOARTHRITIS INVOLVING MULTIPLE JOINTS: Primary | ICD-10-CM

## 2025-05-29 LAB
ALBUMIN SERPL-MCNC: 4.7 G/DL (ref 3.2–4.8)
ALBUMIN/GLOB SERPL: 1.6 {RATIO} (ref 1–2)
ALP LIVER SERPL-CCNC: 92 U/L (ref 39–100)
ALT SERPL-CCNC: 23 U/L (ref 10–49)
ANION GAP SERPL CALC-SCNC: 12 MMOL/L (ref 0–18)
AST SERPL-CCNC: 28 U/L (ref ?–34)
BASOPHILS # BLD AUTO: 0.07 X10(3) UL (ref 0–0.2)
BASOPHILS NFR BLD AUTO: 1 %
BILIRUB SERPL-MCNC: 0.6 MG/DL (ref 0.3–1.2)
BUN BLD-MCNC: 11 MG/DL (ref 9–23)
CALCIUM BLD-MCNC: 9.9 MG/DL (ref 8.7–10.6)
CHLORIDE SERPL-SCNC: 103 MMOL/L (ref 98–112)
CO2 SERPL-SCNC: 26 MMOL/L (ref 21–32)
CREAT BLD-MCNC: 1.16 MG/DL (ref 0.55–1.02)
EGFRCR SERPLBLD CKD-EPI 2021: 58 ML/MIN/1.73M2 (ref 60–?)
EOSINOPHIL # BLD AUTO: 0.29 X10(3) UL (ref 0–0.7)
EOSINOPHIL NFR BLD AUTO: 4 %
ERYTHROCYTE [DISTWIDTH] IN BLOOD BY AUTOMATED COUNT: 12.7 %
ERYTHROCYTE [SEDIMENTATION RATE] IN BLOOD: 23 MM/HR (ref 0–20)
FASTING STATUS PATIENT QL REPORTED: NO
GLOBULIN PLAS-MCNC: 3 G/DL (ref 2–3.5)
GLUCOSE BLD-MCNC: 39 MG/DL (ref 70–99)
HCT VFR BLD AUTO: 43.7 % (ref 35–48)
HGB BLD-MCNC: 14.4 G/DL (ref 12–16)
IMM GRANULOCYTES # BLD AUTO: 0.04 X10(3) UL (ref 0–1)
IMM GRANULOCYTES NFR BLD: 0.6 %
LYMPHOCYTES # BLD AUTO: 2.34 X10(3) UL (ref 1–4)
LYMPHOCYTES NFR BLD AUTO: 32.4 %
MCH RBC QN AUTO: 32.1 PG (ref 26–34)
MCHC RBC AUTO-ENTMCNC: 33 G/DL (ref 31–37)
MCV RBC AUTO: 97.3 FL (ref 80–100)
MONOCYTES # BLD AUTO: 0.53 X10(3) UL (ref 0.1–1)
MONOCYTES NFR BLD AUTO: 7.3 %
NEUTROPHILS # BLD AUTO: 3.95 X10 (3) UL (ref 1.5–7.7)
NEUTROPHILS # BLD AUTO: 3.95 X10(3) UL (ref 1.5–7.7)
NEUTROPHILS NFR BLD AUTO: 54.7 %
OSMOLALITY SERPL CALC.SUM OF ELEC: 288 MOSM/KG (ref 275–295)
PLATELET # BLD AUTO: 393 10(3)UL (ref 150–450)
POTASSIUM SERPL-SCNC: 4.2 MMOL/L (ref 3.5–5.1)
PROT SERPL-MCNC: 7.7 G/DL (ref 5.7–8.2)
RBC # BLD AUTO: 4.49 X10(6)UL (ref 3.8–5.3)
SODIUM SERPL-SCNC: 141 MMOL/L (ref 136–145)
WBC # BLD AUTO: 7.2 X10(3) UL (ref 4–11)

## 2025-05-29 PROCEDURE — 85025 COMPLETE CBC W/AUTO DIFF WBC: CPT | Performed by: INTERNAL MEDICINE

## 2025-05-29 PROCEDURE — 80053 COMPREHEN METABOLIC PANEL: CPT | Performed by: INTERNAL MEDICINE

## 2025-05-29 PROCEDURE — 85652 RBC SED RATE AUTOMATED: CPT | Performed by: INTERNAL MEDICINE

## 2025-05-29 RX ORDER — METHYLPREDNISOLONE 4 MG/1
TABLET ORAL
Qty: 1 EACH | Refills: 0 | Status: SHIPPED | OUTPATIENT
Start: 2025-05-29

## 2025-05-30 ENCOUNTER — HOSPITAL ENCOUNTER (OUTPATIENT)
Dept: GENERAL RADIOLOGY | Age: 50
Discharge: HOME | End: 2025-05-30
Attending: INTERNAL MEDICINE
Payer: COMMERCIAL

## 2025-05-30 DIAGNOSIS — M15.0 PRIMARY OSTEOARTHRITIS INVOLVING MULTIPLE JOINTS: ICD-10-CM

## 2025-05-30 PROCEDURE — 73030 X-RAY EXAM OF SHOULDER: CPT | Performed by: INTERNAL MEDICINE

## 2025-05-30 PROCEDURE — 73502 X-RAY EXAM HIP UNI 2-3 VIEWS: CPT | Performed by: INTERNAL MEDICINE

## 2025-06-02 ENCOUNTER — OFFICE VISIT (OUTPATIENT)
Dept: RHEUMATOLOGY | Facility: CLINIC | Age: 50
End: 2025-06-02
Payer: COMMERCIAL

## 2025-06-02 ENCOUNTER — TELEPHONE (OUTPATIENT)
Facility: CLINIC | Age: 50
End: 2025-06-02

## 2025-06-02 VITALS
BODY MASS INDEX: 26.63 KG/M2 | DIASTOLIC BLOOD PRESSURE: 68 MMHG | OXYGEN SATURATION: 97 % | HEIGHT: 64 IN | TEMPERATURE: 98 F | HEART RATE: 83 BPM | WEIGHT: 156 LBS | RESPIRATION RATE: 16 BRPM | SYSTOLIC BLOOD PRESSURE: 100 MMHG

## 2025-06-02 DIAGNOSIS — Z79.899 ENCOUNTER FOR DRUG THERAPY: ICD-10-CM

## 2025-06-02 DIAGNOSIS — E16.2 HYPOGLYCEMIA: ICD-10-CM

## 2025-06-02 DIAGNOSIS — M06.00 SERONEGATIVE RHEUMATOID ARTHRITIS (HCC): ICD-10-CM

## 2025-06-02 DIAGNOSIS — D84.9 IMMUNOCOMPROMISED (HCC): ICD-10-CM

## 2025-06-02 DIAGNOSIS — M79.7 FIBROMYALGIA: ICD-10-CM

## 2025-06-02 DIAGNOSIS — M05.79 RHEUMATOID ARTHRITIS INVOLVING MULTIPLE SITES WITH POSITIVE RHEUMATOID FACTOR (HCC): Primary | ICD-10-CM

## 2025-06-02 DIAGNOSIS — M05.79 RHEUMATOID ARTHRITIS INVOLVING MULTIPLE SITES WITH POSITIVE RHEUMATOID FACTOR (HCC): ICD-10-CM

## 2025-06-02 DIAGNOSIS — M15.0 PRIMARY OSTEOARTHRITIS INVOLVING MULTIPLE JOINTS: ICD-10-CM

## 2025-06-02 PROCEDURE — 3008F BODY MASS INDEX DOCD: CPT | Performed by: INTERNAL MEDICINE

## 2025-06-02 PROCEDURE — 3078F DIAST BP <80 MM HG: CPT | Performed by: INTERNAL MEDICINE

## 2025-06-02 PROCEDURE — 99215 OFFICE O/P EST HI 40 MIN: CPT | Performed by: INTERNAL MEDICINE

## 2025-06-02 PROCEDURE — 3074F SYST BP LT 130 MM HG: CPT | Performed by: INTERNAL MEDICINE

## 2025-06-02 RX ORDER — MELOXICAM 7.5 MG/1
7.5 TABLET ORAL DAILY
Qty: 90 TABLET | Refills: 0 | OUTPATIENT
Start: 2025-06-02

## 2025-06-02 RX ORDER — METHOTREXATE 2.5 MG/1
10 TABLET ORAL WEEKLY
Qty: 48 TABLET | Refills: 0 | Status: SHIPPED | OUTPATIENT
Start: 2025-06-02

## 2025-06-02 RX ORDER — ADALIMUMAB 40MG/0.4ML
40 KIT SUBCUTANEOUS
Qty: 2 EACH | Refills: 3 | Status: SHIPPED | OUTPATIENT
Start: 2025-06-02

## 2025-06-02 RX ORDER — PREDNISONE 5 MG/1
10 TABLET ORAL DAILY
Qty: 60 TABLET | Refills: 0 | Status: SHIPPED | OUTPATIENT
Start: 2025-06-02

## 2025-06-02 RX ORDER — DULOXETIN HYDROCHLORIDE 60 MG/1
60 CAPSULE, DELAYED RELEASE ORAL DAILY
Qty: 90 CAPSULE | Refills: 1 | Status: SHIPPED | OUTPATIENT
Start: 2025-06-02

## 2025-06-02 NOTE — PROGRESS NOTES
[facility], [address] [place] [address]      Consult     Melissa Bowman Patient Status:  No patient class for patient encounter    3/28/1975 MRN [id number]   Location [facility], [address] [place] [address] Attending No att. providers found   Hosp Day # 0 PCP Johnnie Walker DO     Referring Provider: PCP    Reason for Consultation: Seropositive rheumatoid arthritis; osteoarthritis; fibromyalgia    Subjective:    Melissa Bowman is a 50 year old female with     HPI:Seropositive +RF, +CCP Rheumatoid Arthritis, Osteoarthritis, Fibromyalgia    50-year-old woman comes in for followup for seropositive RA and osteoarthritis and fibromyalgia.     Patient was last seen in clinic 2025     She states we had sent orders to she states we have sent orders for Humira however she never called them to get the medication and she has been off treatment for 4 to 6 months    She stated she may have mentioned forgot to tell us this because of LFT abnormalities and renal insufficiency we had reduced her methotrexate down to 12.5 mg once a week.  She has held it completely for the last 6 to 8 weeks noticed increased arthralgias in her wrist hands and shoulders    Over the weekend we had obtained her labs with improvement in her LFTs but continued mild renal sufficiency and reduced further to 10 mg once a week    Admitted today that she has not been taking her Humira which she will call and get this approved if there is a problem she will let us know    She had started her Medrol Dosepak with improvement of her pain in her shoulders and hands and her joints.  She is open to low-dose prednisone 10 mg for 2 weeks 5 mg for 2 weeks and then stop until she can get back on Humira consistently for the next 4 weeks    She realizes now that Humira was helping her more than she thought    Discussed importance of compliance in order for her to feel better    We had also tried to reach out to her  because of her blood sugar of 39 was alerted on report patient states she clinically was fine but she does have episodes where she feels more tired and lightheaded.  We had discussed with her being on phentermine for the last year through her PCP this could be a drug induced side effect she is not diabetic she will reach out to her PCP to further discuss this    Is asymptomatic at this time    Mild shoulder pain and hand pain but otherwise improved with Medrol Dosepak    Previously given trazodone but she never tried it with Cymbalta 60 mg daily because of fear of side effects    She never ended up seeing a psychiatrist or therapist.    Continues to have stressors with nonrestorative sleep fatigue and some neuropathies because of ongoing issues with child support and tax issues with her previous ex- from 2018 as she is fighting with the [employer]    She states no flareups of her rheumatoid arthritis itself.     At that time she continued on Humira 40 subcutaneous every other week and methotrexate 15mg once a week.     She states somehow she has been getting her medications despite no labs since last visit    X-rays of the hands and feet showed stable arthritic changes December 2023 chest x-ray showed atelectasis patient had no signs or symptoms of infection.  Continues to do well without any recent infections shortness of breath or chest pain    Denies any fevers or chills    Reminded patient to update labs shortly.  New standing labs given to patient    She was also continued on Cymbalta 60 mg daily.    She takes them her meloxicam periodically     She is not having any major flareups since last visit.    SHe states no infection since last visit.     Previously she was also diagnosed with ADHD depression and anxiety to her PCP.     Denies Any suicidal ideation. Her left shoulder has been doing well. When this flares up she sees her orthopedic surgeon.    She has not had a shoulder injection quite. MRI did reveal  previously moderate a.c. joint arthritis.     Denies any swelling or pain of her other joints.     Her rheumatoid arthritis has been stable. Denies any fevers or chills no shortness breath or chest pain. Denies any infection since last visit. Her weight has been stable.    As for her RA, she otherwise feels she is stable. Last Vectra was in low disease activity range at 28 in December 2016. She denies swelling in pain in her other joints.     She historically has had positive RF and CCP antibodies and negative KENYON.     History/Other:        Past Medical History:  Past Medical History:    Fibromyalgia    Rheumatoid arthritis (HCC)        Past Surgical History: History reviewed. No pertinent surgical history.    Social History:  reports that she has quit smoking. She has never used smokeless tobacco. She reports current alcohol use. She reports that she does not use drugs.    Family History: History reviewed. No pertinent family history.    Allergies:   Allergies   Allergen Reactions    Sulfa Antibiotics HIVES, PALPITATIONS, SHORTNESS OF BREATH and UNKNOWN     Other reaction(s): Palpitations       Current Medications:  Current Outpatient Medications   Medication Sig Dispense Refill    predniSONE 5 MG Oral Tab Take 2 tablets (10 mg total) by mouth daily. 10mg x 2 weeks then 5mg daily x 2 weeks then stop 60 tablet 0    methotrexate 2.5 MG Oral Tab Take 4 tablets (10 mg total) by mouth once a week. 48 tablet 0    DULoxetine 60 MG Oral Cap DR Particles Take 1 capsule (60 mg total) by mouth daily. 90 capsule 1    Adalimumab (HUMIRA, 2 PEN,) 40 MG/0.4ML Subcutaneous Auto-injector Kit Inject 40 mg into the skin every 14 (fourteen) days. 2 each 3    methylPREDNISolone (MEDROL) 4 MG Oral Tablet Therapy Pack As directed. 1 each 0    Phentermine HCl 37.5 MG Oral Tab Take 1 tablet (37.5 mg total) by mouth daily.      MAGNESIUM OR Take by mouth daily.      Cholecalciferol (VITAMIN D-3 OR) Take by mouth daily.      folic acid 1 MG  Oral Tab Take 1 tablet (1 mg total) by mouth daily. 90 tablet 3    cetirizine (ZYRTEC ALLERGY) 10 MG Oral Tab Take 1 tablet (10 mg total) by mouth daily.        No current facility-administered medications for this visit.       (Not in a hospital admission)      Review of Systems:     Constitutional: Negative for chills, , fatigue, fever and unexpected weight change.    HENT: Negative for congestion, and mouth sores.    Eyes: Negative for photophobia, pain, redness and visual disturbance.    Respiratory: Negative for apnea, cough, chest tightness, shortness of breath, wheezing and stridor.    Cardiovascular: Negative for chest pain, palpitations and leg swelling.    Gastrointestinal: Negative for abdominal distention, abdominal pain, blood in stool, constipation, diarrhea and nausea.    Endocrine: Negative.     Genitourinary: Negative for decreased urine volume, difficulty urinating, dyspareunia, dysuria, flank pain, and frequency.    Musculoskeletal: Occasional for arthralgias, no gait problem and joint swelling.    Skin: Negative for color change, pallor and rash. No raynauds or digital ulcerations no sclerodactly.    Allergic/Immunologic: Negative.    Neurological: Negative for dizziness, tremors, seizures, syncope, speech difficulty, weakness, light-headedness, numbness and headaches.    Hematological: Does not bruise/bleed easily.    Psychiatric/Behavioral: Negative for confusion, decreased concentration, hallucinations, self-injury, sleep disturbance and suicidal ideas or depression.    Objective:   [unfilled]  Vitals:    06/02/25 1351   BP: 100/68   Pulse: 83   Resp: 16   Temp: 98.2 °F (36.8 °C)          Constitutional: is oriented to person, place, and time. Appears well-developed and well-nourished. No distress.    HEENT: Normocephalic; EOMI; no jvd; no LAD; no oral or nasal ulcers.     Eyes: Conjunctivae and EOM are normal. Pupils are equal, round, and reactive to light.     Neck: Normal range of  motion. No thyromegaly present.    Cardiovascular: RRR, no murmurs.    Lungs: Clear, Bilateral air entry, no wheezes.    Abdominal: Soft.    Musculoskeletal:         Joint Exam 06/02/2025        Right  Left   Acromioclavicular   Tender   Tender   Glenohumeral   Tender   Tender   CMC   Tender   Tender        Swollen: 0  0    Tender: 6  0        Right shoulder: Exhibits normal range of motion on abduction and internal rotation, mild tenderness, no bony tenderness, no deformity, no laceration, no pain and no spasm.        Left shoulder: Exhibits normal range of motion on abduction and internal and external rotation.  mild tenderness, no bony tenderness, no swelling, no effusion, no deformity, no pain, no spasm and normal strength.        Right elbow:  Exhibits normal range of motion, no swelling, no effusion and no deformity. No tenderness found. No medial epicondyle, no lateral epicondyle and no olecranon process tenderness noted. There are no contractures or tophi or nodules.        Left elbow:  Normal range of motion, no swelling, no effusion and no deformity. No medial epicondyle, no lateral epicondyle and no olecranon process tenderness noted. There are no contractures or tophi or nodules.        Right wrist:  Exhibits normal range of motion, no tenderness, no bony tenderness, no swelling, no effusion and no crepitus. Flexion and extension intact w/o limitation.        Left wrist: Exhibits normal range of motion, no tenderness, no bony tenderness, no swelling, no effusion, no crepitus and no deformity. Flexion and extension intact without limitation.        Right hip: Exhibits normal range of motion, normal strength, no tenderness, no bony tenderness, no swelling and no crepitus.        Right hand: No synovitis of MCP,PIP or DIP joints; no Bouchards or Heberden nodules noted;  strength: 100%.  Mild squaring first CMC joint        Left hand: No synovitis of MCP,PIP or DIP joints; no Bouchards or Heberden  nodules noted;  strength: 100%.  Mild squaring first CMC joint        Left hip: Exhibits normal range of motion, normal strength, no tenderness, no bony tenderness, No swelling and no crepitus.        Right knee: Exhibits normal range of motion, no swelling, no effusion, no ecchymosis, no deformity and no erythema. No tenderness found. No medial joint line, no lateral joint line, no MCL and no LCL tenderness noted. mild crepitation on flexion of knee and extension normal.        Left knee:  Exhibits normal range of motion, no swelling, no effusion, no ecchymosis and no erythema. No tenderness found. No medial joint line, no lateral joint line and no patellar tendon tenderness noted. mild crepitation on flexion of the knee. Extension intact and normal.        Right ankle: No swelling, no deformity. No tenderness. Dorsiflexion and plantar flexion intact without limitation in range of motion.        Left ankle: Exhibits no swelling. No tenderness. No lateral malleolus and no medial malleolus tenderness found. Achilles tendon normal. Achilles tendon exhibits no pain, no defect and normal Echeverria's test results.  Dorsiflexion and plantar flexion intact without limitation in range of motion.        Cervical back: Exhibits normal range of motion, no tenderness, no bony tenderness, no swelling, no pain and no spasm.        Thoracic back: Exhibits normal range of motion, no tenderness, no bony tenderness and no spasm.        Lumbar back:  Exhibits normal range of motion, no tenderness, no bony tenderness, no pain and no spasm.        Right foot: normal. There is normal range of motion, no tenderness, no bony tenderness, no crepitus and no laceration. There is no synovitis or tenderness of the MTP joints to palpation.  Bony enlargement first MTP joint        Left foot: normal. There is normal range of motion, no tenderness, no bony tenderness and no crepitus. There is no synovitis or tenderness of the MTP joints to  palpation.  Bony enlargement first MTP joint    Lymphadenopathy: No submental, no submandibular, and no occipital adenopathy present, has no cervical adenopathy or axillary lympadenopathy.    Neurological: Alert and oriented. No focal motor or sensory abnormalities. Strength is 5/5 Upper Extremities/Lower Extremities proximally and distally.    Skin: Skin is warm, dry and intact.  No rashes no purpuric petechial lesions    Psychiatric: Normal behavior.    Results:    Labs:      Lab Results   Component Value Date    WBC 7.2 05/29/2025    RBC 4.49 05/29/2025    HGB 14.4 05/29/2025    HCT 43.7 05/29/2025    MCV 97.3 05/29/2025    MCH 32.1 05/29/2025    MCHC 33.0 05/29/2025    RDW 12.7 05/29/2025    .0 05/29/2025       No components found for: \"RELY\", \"NMET\", \"MYEL\", \"PROMY\", \"SUNIL\", \"ABSNEUTS\", \"ABSBANDS\", \"ABMM\", \"ABMY\", \"ABPM\", \"ABBL\"      Lab Results   Component Value Date     05/29/2025    K 4.2 05/29/2025    CO2 26.0 05/29/2025    BUN 11 05/29/2025    ALB 4.7 05/29/2025    AST 28 05/29/2025    ALT 23 05/29/2025          No components found for: \"ESRWESTERGRN\"       No results found for: \"CRP\"      No results found for: \"COLOR\", \"CLARITY\", \"UROBILINOGEN\", \"YEAST\"  @LABRCNTIP(RF,B12)@      [unfilled]    Imaging:  XR SHOULDER, COMPLETE (MIN 2 VIEWS), LEFT (CPT=73030)  Result Date: 5/30/2025  CONCLUSION:  No acute left shoulder bony abnormality identified.   LOCATION:  [facility]   Dictated by (CST): Mirza Wagoner MD on 5/30/2025 at 5:07 PM     Finalized by (CST): Mirza Wagoner MD on 5/30/2025 at 5:08 PM       XR HIP W OR WO PELVIS 2 OR 3 VIEWS, RIGHT (CPT=73502)  Result Date: 5/30/2025  CONCLUSION:  No acute osseous abnormality identified in the right hip.   LOCATION:  [facility]   Dictated by (CST): Mirza Wagoner MD on 5/30/2025 at 5:07 PM     Finalized by (CST): Mirza Wagoner MD on 5/30/2025 at 5:07 PM         MRI 6/17: left shoulder:    X rays foot 11/15:    X ray hands 11/15:    Chest x  ray 8/15:      Assessment & Plan:      Seropositive rheumatoid arthritis  Osteoarthritis  Fibromyalgia with nonrestorative sleep fatigue depression neuropathies insomnia  Encounter for therapeutic drug monitoring  Episodes of hypoglycemia with lab work.  Seems occasionally symptomatic suggested she reach out to PCP to consider could be drug-related for phentermine over the last year  Mild rotator cuff tendinitis possible mild flareup because of being off Humira and methotrexate    Patient has no synovitis on exam.  Suspect mild active flareup because of being off Humira she admits for the last 6 months  Noncompliance to treatment discussed importance of compliance and likely reason of flareup  Restart Humira 40 mg subcutaneous every other week  We also held methotrexate for the last 6 to 8 weeks  Continue methotrexate but reduce dose to 10 mg once a week  Will repeat CMP in 4 weeks  Complete Medrol Dosepak started today per patient report    Labs reviewed November 2024 CBC CMP sed rate normal.  States she had labs January 2025  New standing labs given to patient  Discussed the importance of monitoring for toxicity  We will give prednisone 10 mg for 2 weeks 5 mg for 2 weeks and then stop  She has a problem getting Humira she will let us know she admits that she never made the call to get her medication and we did send our part  Mainly chronic changes of the right foot and MTP joints. She does have significant calcaneal spurs based on x-rays. She had no erosive changes.   Hand x-rays showing stable arthritic changes.  Chest x-ray showing atelectasis no signs symptomatically of infection.  From December 2023  Discussed the importance of compliance to prevent long term joint damage     She is having a lot of financial issues at this time from previous divorce    He had false positive hCG testing but there is no evidence she was pregnant she is hoping to get her colonoscopy shortly after this is cleared up    Recent  death of her dad coping better more recently    Generalized osteoarthritis: Continue Tylenol.    Episodes of hypoglycemia occasionally symptomatic discussed with patient to follow-up with PCP it could be related to phentermine or other issues further workup with her primary care physician will fax my note to PCP to discuss she promised to make an appointment to discuss this with them    Bone health: She is still having periods. She is not postmenopausal.    Chronic plantar fasciitis improving with intervention stretching and foot inserts. Responded to Medrol Dosepak in the past    Myofascial pain syndrome//fibromyalgia: Continue Cymbalta 60 mg daily.     recommend she see a psychiatrist for management of her depression, anxiety and ADHD.   Appears to be better controlled. No insomnia is the biggest problem       Education and counseling provided to patient.  Instructed patient to call my office or seek medical attention immediately if symptoms worsen. Risks and side effects of medications and diagnosis discussed in detail and patient was given written information on new prescribed medications.    Return to clinic:  Return in about 4 months (around 10/2/2025).    Dimple Rosenberg MD  12/4/2023

## 2025-07-08 ENCOUNTER — PATIENT MESSAGE (OUTPATIENT)
Dept: RHEUMATOLOGY | Facility: CLINIC | Age: 50
End: 2025-07-08

## 2025-07-08 ENCOUNTER — LAB ENCOUNTER (OUTPATIENT)
Dept: LAB | Age: 50
End: 2025-07-08
Attending: INTERNAL MEDICINE
Payer: COMMERCIAL

## 2025-07-08 DIAGNOSIS — M79.7 FIBROMYALGIA: ICD-10-CM

## 2025-07-08 DIAGNOSIS — M06.00 SERONEGATIVE RHEUMATOID ARTHRITIS (HCC): ICD-10-CM

## 2025-07-08 DIAGNOSIS — D84.9 IMMUNOCOMPROMISED (HCC): ICD-10-CM

## 2025-07-08 DIAGNOSIS — M15.0 PRIMARY OSTEOARTHRITIS INVOLVING MULTIPLE JOINTS: ICD-10-CM

## 2025-07-08 DIAGNOSIS — M05.79 RHEUMATOID ARTHRITIS INVOLVING MULTIPLE SITES WITH POSITIVE RHEUMATOID FACTOR (HCC): ICD-10-CM

## 2025-07-08 DIAGNOSIS — M05.79 RHEUMATOID ARTHRITIS INVOLVING MULTIPLE SITES WITH POSITIVE RHEUMATOID FACTOR (HCC): Primary | ICD-10-CM

## 2025-07-08 LAB
ALBUMIN SERPL-MCNC: 4.6 G/DL (ref 3.2–4.8)
ALBUMIN/GLOB SERPL: 1.6 {RATIO} (ref 1–2)
ALP LIVER SERPL-CCNC: 66 U/L (ref 39–100)
ALT SERPL-CCNC: 22 U/L (ref 10–49)
ANION GAP SERPL CALC-SCNC: 8 MMOL/L (ref 0–18)
AST SERPL-CCNC: 27 U/L (ref ?–34)
BASOPHILS # BLD AUTO: 0.05 X10(3) UL (ref 0–0.2)
BASOPHILS NFR BLD AUTO: 0.7 %
BILIRUB SERPL-MCNC: 0.8 MG/DL (ref 0.3–1.2)
BUN BLD-MCNC: 14 MG/DL (ref 9–23)
CALCIUM BLD-MCNC: 9.7 MG/DL (ref 8.7–10.6)
CHLORIDE SERPL-SCNC: 102 MMOL/L (ref 98–112)
CO2 SERPL-SCNC: 29 MMOL/L (ref 21–32)
CREAT BLD-MCNC: 1.11 MG/DL (ref 0.55–1.02)
EGFRCR SERPLBLD CKD-EPI 2021: 61 ML/MIN/1.73M2 (ref 60–?)
EOSINOPHIL # BLD AUTO: 0.18 X10(3) UL (ref 0–0.7)
EOSINOPHIL NFR BLD AUTO: 2.5 %
ERYTHROCYTE [DISTWIDTH] IN BLOOD BY AUTOMATED COUNT: 13.2 %
ERYTHROCYTE [SEDIMENTATION RATE] IN BLOOD: 10 MM/HR (ref 0–20)
FASTING STATUS PATIENT QL REPORTED: NO
GLOBULIN PLAS-MCNC: 2.8 G/DL (ref 2–3.5)
GLUCOSE BLD-MCNC: 55 MG/DL (ref 70–99)
HCT VFR BLD AUTO: 42.4 % (ref 35–48)
HGB BLD-MCNC: 14.4 G/DL (ref 12–16)
IMM GRANULOCYTES # BLD AUTO: 0.01 X10(3) UL (ref 0–1)
IMM GRANULOCYTES NFR BLD: 0.1 %
LYMPHOCYTES # BLD AUTO: 3.65 X10(3) UL (ref 1–4)
LYMPHOCYTES NFR BLD AUTO: 51 %
MCH RBC QN AUTO: 32.6 PG (ref 26–34)
MCHC RBC AUTO-ENTMCNC: 34 G/DL (ref 31–37)
MCV RBC AUTO: 95.9 FL (ref 80–100)
MONOCYTES # BLD AUTO: 0.59 X10(3) UL (ref 0.1–1)
MONOCYTES NFR BLD AUTO: 8.3 %
NEUTROPHILS # BLD AUTO: 2.67 X10 (3) UL (ref 1.5–7.7)
NEUTROPHILS # BLD AUTO: 2.67 X10(3) UL (ref 1.5–7.7)
NEUTROPHILS NFR BLD AUTO: 37.4 %
OSMOLALITY SERPL CALC.SUM OF ELEC: 286 MOSM/KG (ref 275–295)
PLATELET # BLD AUTO: 409 10(3)UL (ref 150–450)
POTASSIUM SERPL-SCNC: 4.3 MMOL/L (ref 3.5–5.1)
PROT SERPL-MCNC: 7.4 G/DL (ref 5.7–8.2)
RBC # BLD AUTO: 4.42 X10(6)UL (ref 3.8–5.3)
SODIUM SERPL-SCNC: 139 MMOL/L (ref 136–145)
WBC # BLD AUTO: 7.2 X10(3) UL (ref 4–11)

## 2025-07-08 PROCEDURE — 80053 COMPREHEN METABOLIC PANEL: CPT | Performed by: INTERNAL MEDICINE

## 2025-07-08 PROCEDURE — 85652 RBC SED RATE AUTOMATED: CPT | Performed by: INTERNAL MEDICINE

## 2025-07-08 PROCEDURE — 85025 COMPLETE CBC W/AUTO DIFF WBC: CPT | Performed by: INTERNAL MEDICINE

## 2025-07-14 DIAGNOSIS — M05.79 RHEUMATOID ARTHRITIS INVOLVING MULTIPLE SITES WITH POSITIVE RHEUMATOID FACTOR (HCC): ICD-10-CM

## 2025-07-14 DIAGNOSIS — M06.00 SERONEGATIVE RHEUMATOID ARTHRITIS (HCC): ICD-10-CM

## 2025-07-14 NOTE — TELEPHONE ENCOUNTER
Her Blood sugar still lower end of 55  Before that 33  Please have her followup with PCP  Fax labs  If he I on methotrexate decrease dose to 7.5mg once a week  Kidney fxn improved but still slightly elevated

## 2025-07-15 NOTE — TELEPHONE ENCOUNTER
Spoke to patient regarding the below message:    Her Blood sugar still lower end of 55  Before that 33  Please have her followup with PCP  Fax labs  If he I on methotrexate decrease dose to 7.5mg once a week  Kidney fxn improved but still slightly elevated         Patient verbalized understanding and stated that she would call her PCP and make an apt to be seen.   Patient's lab results faxed over to Dr Walker's office for review.

## 2025-07-16 RX ORDER — METHOTREXATE 2.5 MG/1
TABLET ORAL
Qty: 36 TABLET | Refills: 0 | Status: SHIPPED | OUTPATIENT
Start: 2025-07-16

## 2025-07-29 ENCOUNTER — APPOINTMENT (OUTPATIENT)
Dept: URBAN - METROPOLITAN AREA CLINIC 317 | Age: 50
Setting detail: DERMATOLOGY
End: 2025-07-29

## 2025-07-29 DIAGNOSIS — Z87.2 PERSONAL HISTORY OF DISEASES OF THE SKIN AND SUBCUTANEOUS TISSUE: ICD-10-CM

## 2025-07-29 PROCEDURE — OTHER MIPS QUALITY: OTHER

## 2025-07-29 PROCEDURE — OTHER ADDITIONAL NOTES: OTHER

## 2025-07-29 PROCEDURE — OTHER COUNSELING: OTHER

## 2025-07-29 PROCEDURE — 99212 OFFICE O/P EST SF 10 MIN: CPT

## 2025-07-29 ASSESSMENT — LOCATION SIMPLE DESCRIPTION DERM: LOCATION SIMPLE: RIGHT BREAST

## 2025-07-29 ASSESSMENT — LOCATION DETAILED DESCRIPTION DERM: LOCATION DETAILED: RIGHT MEDIAL BREAST 4-5:00 REGION

## 2025-07-29 ASSESSMENT — LOCATION ZONE DERM: LOCATION ZONE: TRUNK
